# Patient Record
Sex: FEMALE | Race: BLACK OR AFRICAN AMERICAN | Employment: FULL TIME | URBAN - METROPOLITAN AREA
[De-identification: names, ages, dates, MRNs, and addresses within clinical notes are randomized per-mention and may not be internally consistent; named-entity substitution may affect disease eponyms.]

---

## 2019-07-01 ENCOUNTER — PATIENT OUTREACH (OUTPATIENT)
Dept: OTHER | Age: 34
End: 2019-07-01

## 2019-07-01 NOTE — PROGRESS NOTES
CCM progress note    Patient on report as eligible for Case Management. Ann [place] report for inpt stay [facility] / R nephrecomy 6/25. * Surgeon Dr. Mehdi Kimball  * Patient with hx of endometriosis found to have right sided severe hydronephrosis, tortuous ureter and poor perfusion/ left kidney accounting for 87% of total renal function. Patient eligible for [facility] Employee care management    PMH:   Past Medical History:   Diagnosis Date    Hydronephrosis     Ovarian cyst        Current Outpatient Medications   Medication Sig    docusate sodium (COLACE) 100 mg capsule Take 100 mg by mouth two (2) times a day.  HYDROcodone-acetaminophen (NORCO) 5-325 mg per tablet Take  by mouth.  ibuprofen (MOTRIN IB PO) Take 800 mg by mouth. No current facility-administered medications for this visit. Social History:   Social History     Socioeconomic History    Marital status: SINGLE     Spouse name: Not on file    Number of children: Not on file    Years of education: Not on file    Highest education level: Not on file   Occupational History    Not on file   Social Needs    Financial resource strain: Not on file    Food insecurity:     Worry: Not on file     Inability: Not on file    Transportation needs:     Medical: Not on file     Non-medical: Not on file   Tobacco Use    Smoking status: Never Smoker    Smokeless tobacco: Never Used   Substance and Sexual Activity    Alcohol use:  Yes    Drug use: Not on file    Sexual activity: Not on file   Lifestyle    Physical activity:     Days per week: Not on file     Minutes per session: Not on file    Stress: Not on file   Relationships    Social connections:     Talks on phone: Not on file     Gets together: Not on file     Attends Quaker service: Not on file     Active member of club or organization: Not on file     Attends meetings of clubs or organizations: Not on file     Relationship status: Not on file    Intimate partner violence:     Fear of current or ex partner: Not on file     Emotionally abused: Not on file     Physically abused: Not on file     Forced sexual activity: Not on file   Other Topics Concern    Not on file   Social History Narrative    Not on file     Two patient identifiers verified. Discussed the care management program.  Patient agrees to care management services at this time. Patient's primary care provider relationship reviewed with patient and modified, as applicable. * Modified CC:   Yoselin Sampson NP is PCP;  Vicente Yun is GYN; Jordy Guillaume is URO. Patient has significant diagnosis of endometriosis/ right nephrectomy 6/25. * DC Friday from [address] [place] [address] scripts for pain meds were not signed. - Patient has pain meds from previous stent insertion.   - Calls are in to hospital and Dr. Tip Galan office    - CM will also assist in getting pain meds for over the holiday. * Ms. 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 * Kidney pain initially believed to be part of endometriosis. Care management assessment completed:  Surgical/Wound Condition Focused Assessment    Skin- any open wounds or incisions? yes  Description and location of wound- R flank insision/ covered with blue dressing- per pt. C/D/I  In the last 24 hour have you experienced; Fever no    Low body temperature no    Chills or shaking no    Sweating no    Fast heart rate no    Fast breathing no    Dizziness/lightheadedness no    Confusion or unusual change in mental status no    Diarrhea no    Nausea no    Vomiting no    Shortness of breath or difficulty breathing no    Less urine output no    Cold, clammy, and pale skin no     Skin rash or skin color changes no  New or worsening pain? no  If yes, pain rated 0-10: 5-7 Location/pain characteristics:  Surgical pain/ expected.     New or worsening numbness or tingling? no  Activity level- moving several times a day, or as recommended? yes   Bathing and showering instructions? yes  Nutrition- prescribed diet? no   Tolerating food? yes  Hydration- (how much in ounces per day) 5-6 glasses/  CM supports better hydration with heat/ temps in 90's  Medications- new antibiotic? yes             Pain medication? Yes- old script/  See previous note. Does patient understand how and when to take their medications? yes      Patient stated problem: Post op recovery from nephrectomy- need pain meds. Care manager identified primary concern: Smooth recovery, pain management. Emotional Status/Adjustment to Health State: She is clear thinking and is \"just glad it's over. \"      Readiness to Change: []  Pre-contemplative    []  Contemplative  [x]  Preparation               []  Action                  []  Maintenance    Barriers/Challenges to Care: []  Decline in memory    []  Language barrier     []  Emotional                  []  Limited mobility  []  Lack of motivation     [] Vision, hearing or cognitive impairment []  Knowledge [] Financial Barriers  [x]  Other - pain management. Patient stated goal ; Care Manager/Provider identified medical goal   Care Coordination and support following R Nephrectomy    Support System/Resources: Family     Advance Care Planning:  Pt declines at this time. ADLS/DME: None. Referrals: None. Upcoming appointments:   Future Appointments   Date Time Provider Wesley Carol   7/18/2019  9:45 AM Jeffry Ware MD [address] pt activities to achieve better health:   []  Weight loss  []  Improved diabetic control  []  Decreased cholesterol levels  []  Decreased blood pressure  [x]  Monitor for postop recovery problems[]    Patient verbalized understanding of all information discussed. Pt has my contact information for any further questions, concerns, or needs. 12:15pm  Sent CC message to Dr. Fina Paulino requesting assistance with pain medications post op for the holiday. FU with him is not until 7/18. Plan for next call: Follow for pain meds tomorrow. 7/2        Chart Review:      HISTORY OF PRESENT ILLNESS:  Marcelo Echevarria is a 34 y.o. female who presents today for consultation s/p cysto/bilateral RPG, right JJ stent placement 4/17/2019. Patient with right hydroureteronephrosis - CT A/P W showed severe right hydronephrosis and hydroureter to level of the bladder but chronic in nature given cortical thinning.      Patient notes she presented to [facility] with severe right flank pain in early April. CT showed severe chronic right hydro. D/C'ed home. She returned with flank pain a few days later, when Lasix scan showed 13/87% split function (R/L) and unmeasurable T1/2 on the right side. R JJ stent was placed. RPG showed severe right hydro with tortuous ureter and transition point about 3 cm above the Right UO. Visit Vitals  /72   Ht 5' 2\" (1.575 m)   Wt 138 lb (62.6 kg)   BMI 25.24 kg/m²       IMPRESSION  1. Severe right hydronephrosis with severe overlying cortical thinning, as seen on prior CT 4/11/2019.  2. Unremarkable appearance of the left kidney. 3. Incidental 6.7 cm heterogeneous mass in the right adnexa, nonspecific, possible right hydrosalpinx. Clinical correlation warranted. Recommend dedicated pelvic ultrasound for further characterization as clinically warranted. Per chart review, the primary team is already aware and planning for GYN consultation.     Renal Scan 4/11/2019:  IMPRESSION   1. There is markedly delayed minimal to no perfusion of the right kidney. Additionally, there is minimal cortical uptake of radiotracer, and markedly delayed minimal excretion of radiotracer into the lower pole the right kidney. Findings are compatible with chronic urinary obstruction of the right kidney. 2. No scintigraphic evidence of urinary obstruction of the left kidney.    3.  Asymmetric renal function with the left kidney accounting for 87% of total renal function. [facility] outreach      h/o endometriosis, managed by Dr. Alexis Evangelista. No surgeries, trying different methods of birth control. Ruptured ovarian cyst in 2014.      PLAN:    Schedule for right laparoscopic nephrectomy.         We discussed the risks and benefits of laparoscopic nephrectomy including, but not limited to, infection, bleeding, blood transfusion, possible conversion to open nephrectomy, possible injury to surrounding organs requiring immediate or delayed repair, and global anesthesia risks including but not limited to CVA, MI, DVT, PE, pneumonia, and death. The patient expressed understanding and desire to proceed.

## 2019-07-01 NOTE — LETTER
7/1/2019 11:54 AM 
 
Ms. Donnamae Fothergill [address] [phone] Welcome Letter and Visit Checklist 
 
Congratulations for taking a step towards managing your health by participating in the Employee Care Management (ECM) program. ECM is a new model of care designed to improve the coordination of your health care with an emphasis on your overall well-being. This confidential program is offered free of charge to [employer] members and their covered family members. [facility] now partners with [facility]. If you are a qualifying employee, you may receive an additional 10 wellness incentive points for every month of active participation with an Employee Care Manager. BEFORE YOUR NEXT ECM NURSE ASSESSMENT CALL PLEASE USE THIS HANDY CHECKLIST: 
o Make a list of any questions you have about your health including questions about dietary recommendations, lifestyle, and disease management. o Inform the [facility] nurse of any other health care providers that you have visited in the last month and the reason why you visited them. This includes urgent care or the ED. 
o Have a list of all of your prescribed medications ready, over-the counter, herbal and dietary supplements. Inform the [facility] nurse of any refills that you require. o Inform the ECM nurse of any new problems that may have developed in the last month. 
o Confirm the date of your next [facility] nurse assessment call. 
o Anderson for our patient portal Invaluable if you have not already. This is a good way to communicate with your Care Team, including your doctor and [facility] nurse, as well as to view your care plan. 
o If you want to designate a caregiver to have access to your record, please ask your doctor's office for the forms to sign. o Think about any goals you want to begin working on towards a goal of better health. With continued partnership in the [facility] program, we hope to optimize your health, increase your quality of life, and prevent hospitalization. We look forward to continuing to serve you. If you have any health concerns prior to you next [facility] AND SURGERY [facility] outreach, please contact your primary care doctor. Your ECM nurse contact information is listed below for non-urgent questions: 
Christopher Wiggins RN, MS, [address] [facility]       Phone:  [phone]     Fax:  [phone]    [email or web address]

## 2019-07-02 ENCOUNTER — PATIENT OUTREACH (OUTPATIENT)
Dept: OTHER | Age: 34
End: 2019-07-02

## 2019-07-02 RX ORDER — HYDROXYZINE HYDROCHLORIDE 10 MG/1
10 TABLET, FILM COATED ORAL
Refills: 0 | COMMUNITY
Start: 2019-06-05 | End: 2021-11-29

## 2019-07-02 RX ORDER — ACETAMINOPHEN 325 MG/1
TABLET ORAL
COMMUNITY
End: 2022-09-30

## 2019-07-02 NOTE — PROGRESS NOTES
CM  follow up call. Patient with  inpt stay [facility] / R nephrecomy - DC . * Surgeon Dr. Timmy Foley for surgery   * Patient with hx of endometriosis found to have right sided severe hydronephrosis, tortuous ureter and poor perfusion/ left kidney accounting for 87% of total renal function. Chart review:  No documentation     Contacted patient for CM follow up services. Verified  and address for HIPAA security. * MED CHANGES:  Obtained prescription for pain meds: To  later this morning.     - She thinks it is more Norco, but needs to look/ and she is not at home. * MD APTS:  FU with surgeon . CM encourages FU with PCP. * Patient Concerns:  She ran a temp of 99.4 overnight and this morning.     - This is without tylenol.   - She has not been very active due to pain level with no meds. - CM supports patient, phone assessment:  No swelling, pain, inflammation at suture site; No drainage; No SOB, CP, swelling/edema. U/O remains good. She is drinking 6-8 glasses of water a day. No hematuria or unusual odor/color to urine; No lifting or activity which would cause post op trauma. BM today- no abd pain-tenderness. - Using incentive spirometer less to keep from pain. * CM Concerns:  Risk for infection/ risk for post op complications due to inactivity. * Education/ Resources. - OARS for MI used:  CM expresses empathy and understanding of protecting herself from pain with little pain medication. Hopes now that she has new supply, she can be more aggressive in pulmonary toilet. CM provides patient education on why pulmonary toilet is important - prevent URI/ left over anesthesia; pneumonia. Reviewed red flags   - Reviewed DVT risk with inactivity post op/ reviewed red flags.    - CM promotes more intake of water/ encourages diet with fruits/ veggies that also allow for better hydration (melon/ cherries/ oranges)    Patient will:      - Up her intake of water/ add more moist foods; Increase activity (while avoiding heat outside)- discussed options such as walking inside grocery store/ inside closed mall, and inside house (stairs every hour/ once and build as her strength increases). - Use incentive spirometer every hour- actively cough using pillow to brace surgical site. - Take tylenol over the next few days in addition to pain medications (ensuring her new medication is not percocet/ she will look to make sure it is acetaminophen free). - Call physician's service if temp is above 101 or any s/sx of infection/DVT. Patient can call CM until 5pm tonight if concerns or questions arise. Patient is aware that CM will be off until Monday 7/8. Offered CM coverage for Wed/Fri by a peer- Patient declines. She knows she can call PCP/ surgeon on call/ or go to  if needed. Will follow for CM services.    Next planned outreach:  Monday 7/8

## 2019-07-08 ENCOUNTER — PATIENT OUTREACH (OUTPATIENT)
Dept: OTHER | Age: 34
End: 2019-07-08

## 2019-07-08 NOTE — PROGRESS NOTES
CCM Progress Note    10:40 am  Called patient for Care Management FU. Left discreet voice mail with my contact information encourage a call back. Patient with  inpt stay [facility] / José Charlton nephrecomy 6/25- DC 6/28.     * Surgeon Dr. Gris Jones for surgery 7/18  * Patient with hx of endometriosis found to have right sided severe hydronephrosis, tortuous ureter and poor perfusion/ left kidney accounting for 87% of total renal function. No return call. Next attempt if no CB from pt: Thurs 7/18 after FU apt.

## 2019-07-16 PROBLEM — R10.9 FLANK PAIN: Status: ACTIVE | Noted: 2019-04-11

## 2019-07-18 ENCOUNTER — PATIENT OUTREACH (OUTPATIENT)
Dept: OTHER | Age: 34
End: 2019-07-18

## 2019-07-18 NOTE — PROGRESS NOTES
[facility] Progress Note    11:20 am Called patient for Care Management at agreed time. Left discreet voice mail with my contact information encourage a call back. No return call:  Next attempt Thurs 8/8      Chart Review:  FU visit URO  7/16    ASSESSMENT:       ICD-10-CM ICD-9-CM     1. Hydronephrosis, unspecified hydronephrosis type N13.30 591 AMB POC URINALYSIS DIP STICK AUTO W/O MICRO   2. Nonfunctioning kidney N28.9 593.9        - Chronic right distal ureteral obstruction, likely from endometriosis, with severe cortical atrophy and 13% split function by renal scan               S/p right lap nephrectomy on 6/27/2019     - Endometriosis - managed by Dr. Mary Pettit. - S/p ruptured ovarian cyst in 2014.      PLAN:    Surgical pathology reviewed - copy provided for her records  Okay to return to work next week  Contact office with any fevers or worsening pain  FU 3 months with BMP on arrival, sooner as indicated     HISTORY OF PRESENT ILLNESS:  Marcelo Echevarria is a 30 y.o. female who presents today for post-op s/p right lap nephrectomy on 6/27/2019     Patient originally presented to [facility] with severe right flank pain in early April. CT showed severe chronic right hydro. D/C'ed home. She returned with flank pain a few days later, when Lasix scan showed 13/87% split function (R/L) and unmeasurable T1/2 on the right side. R JJ stent was placed. RPG showed severe right hydro with tortuous ureter and transition point about 3 cm above the Right UO.     She presents today post-operatively from recent nephrectomy. She went to ER on 7/7/2019 for abdominal pain that was worsened by start of menstrual cycle. She had CT that showed complex fluid collection at surgical site. She was afebrile without leukocytosis with pain improving while hospitalized so drainage was deferred. Patient reports much improved pain and is no longer needing narcotic pain medication. She wants to return to work. No fevers, chills, N/V.  Improved abdominal swelling with mild residual soreness. No calf pain or leg swelling. No urinary complaints of hematuria or dysuria. Asymptomatic for UTI.      h/o endometriosis, managed by Dr. Arron Toussaint. No surgeries, trying different methods of birth control. Ruptured ovarian cyst in 2014. Visit Vitals  /60   Ht 5' 2\" (1.575 m)   Wt 135 lb (61.2 kg)   BMI 24.69 kg/m²      Surgical Pathology (6/27/2019)  MORCELLATED NON FUNCTIONING RIGHT KIDNEY, RADIAL LAPAROSCOPIC NEPHRECTOMY:    - BENIGN KIDNEY WITH FEATURES OF HYDRONEPHROSIS AND CHRONIC PYELONEPHRITIS.

## 2019-08-08 ENCOUNTER — PATIENT OUTREACH (OUTPATIENT)
Dept: OTHER | Age: 34
End: 2019-08-08

## 2019-08-08 NOTE — LETTER
8/8/2019 1:45 PM 
 
Ms. [address]ille [address] [phone] Dear Ms. Claudette Jules,   
 
I have been trying to reach you for a follow up call for services with our Employee Care Management Program. Your wellbeing is very important to us. With continued partnership in the [facility] AND SURGERY [facility] program, we hope to work with you to optimize your health and increase your quality of life. I look forward to continuing to work with you. Please contact me at your convenience and we can schedule a time that works best for you. Sincerely, 
 
 
 
 
 
Christine Farley RN, MS, [address] [facility]       Phone:  [phone]     Fax:  [phone]    [email or web address]

## 2019-08-08 NOTE — PROGRESS NOTES
[facility] Progress Note    1:45pm Called patient for Care Management at agreed time. Left discreet voice mail with my contact information encourage a call back. * Will send Lost to contact letter. * Noted Visit with [facility] set for 10/10/2019. Patient with  inpt stay [facility] for [facility] / José Charlton nephrecomy 6/25- DC 6/28.     * Surgeon Dr. Vielka Garcia for surgery 7/18  * Patient with hx of endometriosis found to have right sided severe hydronephrosis, tortuous ureter and poor perfusion/ left kidney accounting for 87% of total renal function. Next planned call if no response from patient:  Thurs 8/29. Chart Review:  Path report/  Surgical Pathology Case Report  Surgical Pathology Report                         Case: [id number]                                  Authorizing Provider: Zia Godinez MD        Collected:           06/27/2019 1410              Ordering Location:     [facility]    Received:            06/27/2019 [facility]                                     [facility]                                                    Pathologist:           Patti Marte MD                                                            Specimen:    Kidney Right, morcellated right non functioning kidney in 10% formalin                   Diagnosis  MORCELLATED NON FUNCTIONING RIGHT KIDNEY, RADIAL LAPAROSCOPIC NEPHRECTOMY:    - BENIGN KIDNEY WITH FEATURES OF HYDRONEPHROSIS AND CHRONIC PYELONEPHRITIS.        Electronic Signature: Brianna Chavez MD   CPT: 80995N7  Electronically signed by Oscar Herrera MD on 7/2/2019 at 91 [phone] 06

## 2019-08-27 ENCOUNTER — HOSPITAL ENCOUNTER (OUTPATIENT)
Dept: LAB | Age: 34
Discharge: HOME | End: 2019-08-27
Payer: COMMERCIAL

## 2019-08-27 LAB
25(OH)D3 SERPL-MCNC: 10.6 NG/ML (ref 30–100)
ALBUMIN SERPL-MCNC: 3.7 G/DL (ref 3.4–5)
ALBUMIN/GLOB SERPL: 0.9 {RATIO} (ref 0.8–1.7)
ALP SERPL-CCNC: 60 U/L (ref 45–117)
ALT SERPL-CCNC: 17 U/L (ref 13–56)
ANION GAP SERPL CALC-SCNC: 3 MMOL/L (ref 3–18)
APPEARANCE UR: CLEAR
AST SERPL-CCNC: 9 U/L (ref 10–38)
BACTERIA URNS QL MICRO: ABNORMAL /HPF
BASOPHILS # BLD: 0 K/UL (ref 0–0.1)
BASOPHILS NFR BLD: 0 % (ref 0–3)
BILIRUB SERPL-MCNC: 0.4 MG/DL (ref 0.2–1)
BILIRUB UR QL: NEGATIVE
BUN SERPL-MCNC: 10 MG/DL (ref 7–18)
BUN/CREAT SERPL: 11 (ref 12–20)
CALCIUM SERPL-MCNC: 8.7 MG/DL (ref 8.5–10.1)
CHLORIDE SERPL-SCNC: 107 MMOL/L (ref 100–111)
CHOLEST SERPL-MCNC: 131 MG/DL
CO2 SERPL-SCNC: 28 MMOL/L (ref 21–32)
COLOR UR: YELLOW
CREAT SERPL-MCNC: 0.94 MG/DL (ref 0.6–1.3)
DIFFERENTIAL METHOD BLD: ABNORMAL
EOSINOPHIL # BLD: 0.1 K/UL (ref 0–0.4)
EOSINOPHIL NFR BLD: 4 % (ref 0–5)
EPITH CASTS URNS QL MICRO: ABNORMAL /LPF (ref 0–5)
ERYTHROCYTE [DISTWIDTH] IN BLOOD BY AUTOMATED COUNT: 14.2 % (ref 11.6–14.5)
EST. AVERAGE GLUCOSE BLD GHB EST-MCNC: 108 MG/DL
FERRITIN SERPL-MCNC: 47 NG/ML (ref 8–388)
FOLATE SERPL-MCNC: 9.9 NG/ML (ref 3.1–17.5)
GLOBULIN SER CALC-MCNC: 4.3 G/DL (ref 2–4)
GLUCOSE SERPL-MCNC: 79 MG/DL (ref 74–99)
GLUCOSE UR STRIP.AUTO-MCNC: NEGATIVE MG/DL
HBA1C MFR BLD: 5.4 % (ref 4.2–5.6)
HCT VFR BLD AUTO: 36.1 % (ref 35–45)
HDLC SERPL-MCNC: 59 MG/DL (ref 40–60)
HDLC SERPL: 2.2 {RATIO} (ref 0–5)
HGB BLD-MCNC: 11.5 G/DL (ref 12–16)
HGB UR QL STRIP: ABNORMAL
IRON SATN MFR SERPL: 15 %
IRON SERPL-MCNC: 42 UG/DL (ref 50–175)
KETONES UR QL STRIP.AUTO: NEGATIVE MG/DL
LDLC SERPL CALC-MCNC: 61 MG/DL (ref 0–100)
LEUKOCYTE ESTERASE UR QL STRIP.AUTO: NEGATIVE
LIPID PROFILE,FLP: NORMAL
LYMPHOCYTES # BLD: 1.5 K/UL (ref 0.8–3.5)
LYMPHOCYTES NFR BLD: 54 % (ref 20–51)
MCH RBC QN AUTO: 25.7 PG (ref 24–34)
MCHC RBC AUTO-ENTMCNC: 31.9 G/DL (ref 31–37)
MCV RBC AUTO: 80.6 FL (ref 74–97)
MONOCYTES # BLD: 0.2 K/UL (ref 0–1)
MONOCYTES NFR BLD: 6 % (ref 2–9)
MUCOUS THREADS URNS QL MICRO: ABNORMAL /LPF
NEUTS SEG # BLD: 1 K/UL (ref 1.8–8)
NEUTS SEG NFR BLD: 36 % (ref 42–75)
NITRITE UR QL STRIP.AUTO: NEGATIVE
PH UR STRIP: 5.5 [PH] (ref 5–8)
PLATELET # BLD AUTO: 290 K/UL (ref 135–420)
PMV BLD AUTO: 9.6 FL (ref 9.2–11.8)
POTASSIUM SERPL-SCNC: 4.1 MMOL/L (ref 3.5–5.5)
PROT SERPL-MCNC: 8 G/DL (ref 6.4–8.2)
PROT UR STRIP-MCNC: NEGATIVE MG/DL
RBC # BLD AUTO: 4.48 M/UL (ref 4.2–5.3)
RBC #/AREA URNS HPF: ABNORMAL /HPF (ref 0–5)
RBC MORPH BLD: ABNORMAL
SODIUM SERPL-SCNC: 138 MMOL/L (ref 136–145)
SP GR UR REFRACTOMETRY: 1.02 (ref 1–1.03)
TIBC SERPL-MCNC: 274 UG/DL (ref 250–450)
TRIGL SERPL-MCNC: 55 MG/DL (ref ?–150)
TSH SERPL DL<=0.05 MIU/L-ACNC: 1.5 UIU/ML (ref 0.36–3.74)
UROBILINOGEN UR QL STRIP.AUTO: 0.2 EU/DL (ref 0.2–1)
VIT B12 SERPL-MCNC: 440 PG/ML (ref 211–911)
VLDLC SERPL CALC-MCNC: 11 MG/DL
WBC # BLD AUTO: 2.8 K/UL (ref 4.6–13.2)
WBC URNS QL MICRO: ABNORMAL /HPF (ref 0–4)

## 2019-08-27 PROCEDURE — 82306 VITAMIN D 25 HYDROXY: CPT

## 2019-08-27 PROCEDURE — 85025 COMPLETE CBC W/AUTO DIFF WBC: CPT

## 2019-08-27 PROCEDURE — 83540 ASSAY OF IRON: CPT

## 2019-08-27 PROCEDURE — 36415 COLL VENOUS BLD VENIPUNCTURE: CPT

## 2019-08-27 PROCEDURE — 84443 ASSAY THYROID STIM HORMONE: CPT

## 2019-08-27 PROCEDURE — 83036 HEMOGLOBIN GLYCOSYLATED A1C: CPT

## 2019-08-27 PROCEDURE — 80053 COMPREHEN METABOLIC PANEL: CPT

## 2019-08-27 PROCEDURE — 80061 LIPID PANEL: CPT

## 2019-08-27 PROCEDURE — 81001 URINALYSIS AUTO W/SCOPE: CPT

## 2019-08-27 PROCEDURE — 82728 ASSAY OF FERRITIN: CPT

## 2019-08-27 PROCEDURE — 83021 HEMOGLOBIN CHROMOTOGRAPHY: CPT

## 2019-08-27 PROCEDURE — 82607 VITAMIN B-12: CPT

## 2019-08-27 PROCEDURE — 87389 HIV-1 AG W/HIV-1&-2 AB AG IA: CPT

## 2019-08-28 LAB
HIV 1+2 AB+HIV1 P24 AG SERPL QL IA: NONREACTIVE
HIV12 RESULT COMMENT, HHIVC: NORMAL

## 2019-08-29 ENCOUNTER — PATIENT OUTREACH (OUTPATIENT)
Dept: OTHER | Age: 34
End: 2019-08-29

## 2019-08-29 LAB
DEPRECATED HGB OTHER BLD-IMP: 0 %
HGB A MFR BLD: 97.6 % (ref 96.4–98.8)
HGB A2 MFR BLD COLUMN CHROM: 2.4 % (ref 1.8–3.2)
HGB C MFR BLD: 0 %
HGB F MFR BLD: 0 % (ref 0–2)
HGB FRACT BLD-IMP: NORMAL
HGB S BLD QL SOLY: NEGATIVE
HGB S MFR BLD: 0 %

## 2019-08-29 NOTE — PROGRESS NOTES
[facility] Progress Note      11:10  am  Called patient for Care Management FU. Left discreet voice mail with my contact information encourage a call back. Patient with  inpt stay [facility] / Spike Orozco nephrecomy 6/25- DC 6/28.     * Surgeon Dr. Erlin Neville for surgery 7/18  * Patient with hx of endometriosis found to have right sided severe hydronephrosis, tortuous ureter and poor perfusion/ left kidney accounting for 87% of total renal function. Last successful contact was 7/2/2019. Lost contact letter sent 8/8/2019.

## 2019-09-19 ENCOUNTER — PATIENT OUTREACH (OUTPATIENT)
Dept: OTHER | Age: 34
End: 2019-09-19

## 2019-09-19 NOTE — PROGRESS NOTES
CCM progress note    Called Ms. Salazar Penn  at agreed time. Verified  and address for HIPAA security. Changes since last call include:     Med changes: Added Vit D. Doctors appointments : Upcoming     * Patient is sorry she hasn't returned my calls. - Back to work since later July. - Work has been busy - works days. - After work she has activities or is very tired. - She would like to remain active in CM.     - Mail goes to her mother's home, so she has not seen UTR letter. She will look for it this weekend. * FU with PCP finds anemia;    - Started on Vit D.   - Referred to hematology. - Restricting caffeine. * Some residual fluid pocket with R nephrectomy found on ultrasound.     - No drain placed with surgery; Expected to resolve with no intervention at this point. CM advises warm compresses to the area to assist with fluid removal and relaxing blood vessels/ lymph supply. - No infection. - CM assesses:  Pain is intermittent, not continuous; No elements make it worse or better; She is not aware of any specific times or positions that bring on the pain. Pain is lessening as time goes on.  /  Reviewed red flags.     - No FU until 10/10 with Dr. Fina Paulino,  Urology. * CM shared that she will be taking time off from -10/11 and asks if patient would like a FU call while she is out. Offered contact information for covering RN.     - Pt declines. Will be OK with waiting for CMs return to office. Check in for the patients goals:    1) Goal    :   Care coordination following nephrectomy  A) Progress - Fluid pocket causing pain/  No infection. B) Barriers addressed - pain/  Risk for infection with fluid pocket      C) Utilizing strategy - Red flag teach back; Application of warm compress. D) Plan for next check in  Monday 10/14       Advanced Directive:  Patient offered information on development of Advanced Care Planning.      * Patient defers this topic to another time. Patient's current stage of activation:         Action-  Ongoing support;  Reinforcement of change attempts; Expect set-backs;  Rolling with resistance; OARS       Patient verbalized understanding of all information discussed. Pt has my contact information for any further questions, concerns, or needs. Plan for next call: Monday 10/14 FU after URO apt. Fluid pocket/ anemia workup?

## 2019-10-14 ENCOUNTER — PATIENT OUTREACH (OUTPATIENT)
Dept: OTHER | Age: 34
End: 2019-10-14

## 2019-10-14 NOTE — PROGRESS NOTES
[facility]  Progress Note    * Kuld[place] BEACH report for inpt stay [facility] for Surgery / R nephrecomy . * Surgeon Dr. Escobar Drain  * Patient with hx of endometriosis found to have right sided severe hydronephrosis, tortuous ureter and poor perfusion/ left kidney accounting for 87% of total renal function. * Planned FU visit 10/10 with URO.       12:30pm  Called patient for Care Management at agreed time. Calls failed x 2.    * Call placed at lunchtime for convenience of patient. Incoming call:  4:30pm    Contacted  patient for CM follow up services. Verified  and address for HIPAA security. * \"My pain is almost worse than before I had my surgery\"   - Pain at incision site close to menstrual cycle. - CM asks if endometriosis is in her history/   Yes since 26 yo. CM encourages FU OB/GYN apt to r/o endometriosis as contributing factor to pain. * [facility] this weekend due to the pain. - No infection;  Did not r/o fluid buildup. - Tylenol for pain. - Pt using heating pad - but may have a slight burn from overuse. Teaching on heating pad precautions.   / never sleep with heating pad - try hot water bottle - safer. Limit time of heating pad - use timer - 20 min on/  20 min off;  Use fabric or dish towel for separation - less chance of burn. Try moist heat - towel in hot water - moist heat can penetrate better. She does use salon-pas in addition which is helpful. * URO apt has been pushed back to 10/21.     - Practice mistook her for another patient.     - Dr. Ria Acosta is her MD -   - CM encourages pt to call URO for any tests needed prior to visit to ensure decision making can be done. * Heme/Onc visit - no report on findings yet. * MED CHANGES: None. * MD APTS:  URO 10/21.  ;  PCP aware - ordered BW.    * Patient Concerns:  Ongoing pain from ? Cause. * CM Concerns:  Delay in treatment. * Education/ Resources.   Heating pad use/  Suggested hot water bottle - and fabric separation;  Timer use for 20 minute on/off monitoring. Advanced Directive:  Patient offered information on development of Advanced Care Planning. * Patient defers this topic to another time. Will follow for CM services. Next planned outreach:  Tues 10/22 - after URO apt.

## 2019-10-22 ENCOUNTER — PATIENT OUTREACH (OUTPATIENT)
Dept: OTHER | Age: 34
End: 2019-10-22

## 2019-10-22 NOTE — PROGRESS NOTES
[place] BEACH report for inpt stay [facility] for Surgery / Vane Salinas nephrecomy 6/25.     * Surgeon Dr. Armstrong Other  * Patient with hx of endometriosis found to have right sided severe hydronephrosis, tortuous ureter and poor perfusion/ left kidney accounting for 87% of total renal function    12:20pm  Called patient for Care Management FU after URO visit 10/21. Left discreet voice mail with my contact information encourage a call back. * May be ready to close, but would like to SW patient for final review of needs. Next call planned for:  Nov 12. Chart Review:  URO visit 10/21    S/p right lap nephrectomy on 6/27/2019 for chronically obstructed kidney, likely 2/2 endometriosis. Path: benign with features of hydronephrosis and chronic pyelonephritis              Advised to minimize NSAIDs to help preserve kidney function. As long as kidney function today looks good, follow up PRN     - Endometriosis - managed by Dr. Shey Pimentel.    - S/p ruptured ovarian cyst in 2014.      PLAN:    BMP drawn today, will inform   F/u with GYN for continued management of endometriosis   Recommend monitoring of kidney function annually (BMP) with PCP as long as kidney function today is normal  Follow up PRN

## 2019-11-12 ENCOUNTER — PATIENT OUTREACH (OUTPATIENT)
Dept: OTHER | Age: 34
End: 2019-11-12

## 2019-11-12 NOTE — PROGRESS NOTES
Resolving current episode of case management. Patient has met patient stated and/or medical goals. Patient consistenly demonstrates understanding of the medical action plan through execution of plan. Appointments with key providers are scheduled and attended. Plan of care is modified and updated to address new challenges and barriers with minimal support from the CM team(proactively uses physicians and community resources) The support system remains current and has been modified as needed. Patient continues to acquire needed resources from the current support system established. Teach back demonstrates that patient understands education for self management of chronic conditions. Final call to [facility] patient with R nephrecomy 6/25.     * Patient with hx of endometriosis / right sided severe     * Ms. Centeno Common reports that she is doing much better. Pain is less, will follow up with GYN for lingering endometriosis. - Surgeon attributes pain to endometriosis. * Focus on lifestyle changes with increased hydration and taking regular walks daily. * No further CM needs identified. ECM will follow as needed and patient has ECM contact information for future needs.

## 2020-05-11 ENCOUNTER — DOCUMENTATION ONLY (OUTPATIENT)
Dept: OTHER | Age: 35
End: 2020-05-11

## 2020-07-14 ENCOUNTER — PATIENT OUTREACH (OUTPATIENT)
Dept: OTHER | Age: 35
End: 2020-07-14

## 2020-07-14 NOTE — PROGRESS NOTES
CCM outreach  Former CCM patient. 11:00am  Patient on report as eligible for Case Management. Left discreet message on voicemail with this CM contact information. Will attempt to contact again to offer [address] [place] [address] Management services. * ED visits to [facility] [place]:  6/1- abd pain;  7/4 immune thrombocytopenic purpura.

## 2020-07-16 ENCOUNTER — PATIENT OUTREACH (OUTPATIENT)
Dept: OTHER | Age: 35
End: 2020-07-16

## 2020-07-16 NOTE — PROGRESS NOTES
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 Verified  and address for HIPAA security. Offered continued ECMT services for patient. Patient does not identify any Care Management needs at this time and declines services. * CM encourages patient to use [facility] for her health needs. And utilize Nurse Triage line prior to ED.     - Nephrologist uses [employer]. - She does not know if she has Plus or Flex plan.

## 2020-07-21 ENCOUNTER — PATIENT OUTREACH (OUTPATIENT)
Dept: OTHER | Age: 35
End: 2020-07-21

## 2020-07-21 RX ORDER — DOXYCYCLINE 100 MG/1
100 CAPSULE ORAL 2 TIMES DAILY
COMMUNITY
Start: 2020-07-20 | End: 2020-07-30

## 2020-07-21 RX ORDER — OXYCODONE AND ACETAMINOPHEN 5; 325 MG/1; MG/1
TABLET ORAL
COMMUNITY
Start: 2020-07-19 | End: 2021-11-29

## 2020-07-21 NOTE — PROGRESS NOTES
[facility] progress note    Incoming call from patient requesting  assistance. Patient eligible for Miguel Coronado 994 care management  Former [facility] patient found with  ED visits to [facility] [place]:  6/1- abd pain;  7/4 immune thrombocytopenic purpura     Two patient identifiers verified. * Ms. Sisi Figueroa reports to  that she has been in [facility] [place] discharged yesterday.    - Recurring Abd pain was presenting clinical picture. - Found to have ovarian cyst and abdominal hernia. - She is not aware what kind of hernia it is/ umbilical or surgical.    explains hole in muscle that other tissue becomes stuck in. Explained incarcerated hernia as emergent need to save tissue from dying from lack of blood flow. - She was put on a course of IV antibiotics. Discharged yesterday. PO Doxycycline BID x 10 days. Given Percocet for pain as needed. Home today. -  encourages hydration, ambulation and a light diet. - Needing surgery soon. * She needs assistance with FMLA/ HR contact. -  shares [address] (call [phone]); press 1 for Leave of Absence team.    * Plus plan so identifies need to stay within [facility]. - Surgical practice downstairs from her work/ plans to call today for intake. - reviews that with our new insurance, If you do think that you need to be seen in the Emergency Room, a call to the Nurse Access Line will reduce your copayment. The number is in red on your insurance card and is [phone] ([phone]). Patient's primary care provider relationship reviewed with patient and modified, as applicable. Patient has significant diagnosis of newly diagnosed ovarian cyst and hernia with underlying immune thrombocytopenic purpura    Care management assessment completed:    Patient stated problem /  Care manager identified primary concern   * Care Coordination for ovarian cyst and hernia/ needs surgery.      Emotional Status/Adjustment to Health State: planning surgery- ready to be over the abdominal pain. Readiness to Change: []  Pre-contemplative    []  Contemplative  [x]  Preparation               []  Action                  []  Maintenance    Barriers/Challenges to Care: []  Decline in memory    []  Language barrier     []  Emotional                  []  Limited mobility  []  Lack of motivation     [] Vision, hearing or cognitive impairment [x]  Knowledge [x] Financial Barriers  [x]  Other    Support System/Resources:  Family    Advance Care Planning: Declines. Her mother is her chosen medical decision maker:   Gene Stewart  [phone]    Upcoming appointments: No future appointments. Patient verbalized understanding of all information discussed. Pt has my contact information for any further questions, concerns, or needs. - She will call me when surgery if planned if before next call.     Plan for next call:  2 Weeks 8/4

## 2020-07-21 NOTE — ACP (ADVANCE CARE PLANNING)
Advance Care Planning: Declines.    Her mother is her chosen medical decision maker:   Vicki Jl  [phone]

## 2020-07-24 ENCOUNTER — PATIENT OUTREACH (OUTPATIENT)
Dept: OTHER | Age: 35
End: 2020-07-24

## 2020-07-24 NOTE — PROGRESS NOTES
CCM  Care Coordination. Patient with ovarian cyst, hernia and immune thrombocytopenic purpura. *hx of endometriosis found to have right sided severe hydronephrosis, tortuous ureter and poor perfusion/ left kidney     11:00am  Incoming email from Clau Keating RN.   - Clinical notes indicate pregnancy. - ITP/low platelets and IVIG treatments,    -  UTI and cystitis   - Notes throughout clinical states she is pregnant but no details how many weeks      11:20 am  .  Contacted  patient for CM follow up services. Verified  and address for HIPAA security. * Patient is managing her pain from the ovarian cyst and hernia. - trying to limit Percocet to nightly. * Vaginal bleeding current/ but not a heavy flow- spotting.     - Patient confirms with CM that she is not pregnant. - CM offers red flags of increased flow if platelets are low. *  FU Hem/Onc in Aug - only on oral Fe;  IVIG once to bring up her platelet count. - Mirralax daily to prevent constipation with iron pills. - Eating iron rich foods. *  FU apt Tues - to work up for surgery / for FMLA paperwork     - Hopes to have a date for surgery at that time/ she will keep me updated. * MED CHANGES: Decreasing pain meds. * MD APTS:  PCP T[place];   Hem/Onc in Aug 8/11 she thinks  * Patient Concerns:   None - just anxious to have surgery to relieve pain. * CM Concerns:  Kidney function- risk for bleeding with   * Education/ Resources. Will follow for CM services. Next planned outreach:   after MD powell - schedule surgery?

## 2020-07-28 ENCOUNTER — PATIENT OUTREACH (OUTPATIENT)
Dept: OTHER | Age: 35
End: 2020-07-28

## 2020-07-28 NOTE — PROGRESS NOTES
CM  follow up call. Patient with ovarian cyst, hernia and immune thrombocytopenic purpura. *hx of endometriosis found to have right sided severe hydronephrosis, tortuous ureter and poor perfusion/ left kidney     Chart review:  Providers are not in CC         Patient had apt today to schedule surgery - Check in to share POC and surgical date. 12:50 pm  Attempt to contact patient for transitions of care. Unable to leave message on voicemail \"Mailbox full. \"       Will follow for CM services. Next planned outreach:  Monday 8/3 if no return call from patient.

## 2020-08-03 ENCOUNTER — PATIENT OUTREACH (OUTPATIENT)
Dept: OTHER | Age: 35
End: 2020-08-03

## 2020-08-03 NOTE — PROGRESS NOTES
CM  follow up call. Patient with ovarian cyst, hernia and immune thrombocytopenic purpura. *hx of endometriosis found to have right sided severe hydronephrosis, tortuous ureter and poor perfusion/ left kidney     Chart review:  Providers are not within CC. Contacted  patient for CM follow up services. Verified  and address for HIPAA security. * Ms. Maldonado Search reports that her FU visit with Dr. Madison Galvan wants her to consider before deciding for R sided BSO and fibroid removal.     - She reports that she is firm on having surgery. * She has been out of work for 2 weeks now. - on FMLA- wonders if she will need new FMLA forms for this next surgery. - She has been out with no pay. - Does not know about disability coverage. CM offers referral to [employer] SW- she would like this support. * She is planning Surgery at [facility], but may need a new surgeon.     - Patient would appreciate assistance. Care Coordination:     - CM searches on [facility] Website for surgery options. OP surgery services are available, but unclear if GYN surgery is on the list.   - CM calls [facility] x 3 - on hold x2 with ultimate hang up; And once sent to OP surgery center with prolonged ringing- no answer or answering services. [phone]   - JENNY preforms google search to find:   [id number] and Sandria Landau MD OB/GYN.     - CM looks up on MMO find provider site/ GYN doctors within 50 miles of patient's home.   NO RESULTS FOUND: [email or web address]? [email or web address]/aF7BvzoB3h5FDrbh6kyAOkuwZMUOPyY6ERRr+mjMrw0iPIDzjib1esAWO/0f7F/RhW6srmXaMPH0RZdw/KeJPadrOXMvTDMNqqG+nXs2XaPbSoAlZnWRENdPpmuEJYGgHnHsWLAGRUwfIGxq4AjViioorXQr8KnQ9JfgS5a9bBwBljzOBUdKULWbva1ydKXZfrykNRHmgcoiZ3e9nzJxVeueFpStKZWDWPfFCH16D2SLI3nZo83ZDG6uLxndo9GLTOMisw+zOPWKKPFlTD19Mn90YayjD/4lIR8TqfHOXbYo1iYVeR9GXBVFYcPEHIzn5LMwh2DGPYoPBRgduMlJ+x9nl0OXTcNNs2pQWxCQqAXTApv6no0p4n+PmEq4sMqaXKTBHEDXLNME3TdDtOmNt   - CM calls [employer] [address] [place] [address] Road line:  [phone]. Automated lines would not allow CM to access customer services with no NPI.       - CM resources peer in the [facility] area. Possible sites closest to patient are [facility] and [facility]. Dina Garcia LPN will assist with researching for in network options. 1:58 pm  Called patient back. Advised her to call member's services at [facility] and request an out of network waiver. To have if needed for her Thurs apt.      3:26 Call back from Dina Garcia. [facility] is closing as of Sept 1. Not taking new apts. - Provider Arturo Suero MD office contacted. [phone]   - They do accept MMO insurance. - Patient can call and make apt.     3:30pm  Call out to patient to share this information. Mailbox Full message - unable to leave message for patient. * MED CHANGES: Completed antibiotics/ no more pain meds. * MD APTS:  Ce Ybarra FU with GYN - to plan surgery - may require OON services. Will follow for CM services. Next planned outreach:  8/4 FU as needed.

## 2020-08-03 NOTE — Clinical Note
I need some help with MsManuel Kristal Leventhal, please.     @ Addie Stepan - we spoke about in network services/ providers for a BSO/ fibroidectomy?    @ Eddie Trujillo - can you guide her on disability application? She has been leave without pay for 2 weeks. FMLA in place/ she may need to start a new FMLA with new surgery.

## 2020-08-04 ENCOUNTER — PATIENT OUTREACH (OUTPATIENT)
Dept: OTHER | Age: 35
End: 2020-08-04

## 2020-08-04 NOTE — PROGRESS NOTES
SHAYNE PERDOMO contacted patient to assess financial needs. Patient identifiers confirmed, zip code and . Patient referred by Nurse ECM, Kayode Lopez. Patient updates  Ms. Ely Rivero stated that she was approved for [place]/. She was assigned an Absentee Manager, Eduin Price. Ms. Ely Rivero stated that she will not know when her procedure will be scheduled until 20. The STD paperwork can not be completed until the procedure has been performed. Ms. Ely Rivero stated that she has been out of work for approximately 2 weeks. She added that she does not have any financial concerns at this time. Patient stated that she has money in her savings that she will use until she is eligible for STD    Follow-up  SHAYNE PERDOMO will follow-up with patient in 2 weeks.

## 2020-08-04 NOTE — PROGRESS NOTES
CM  follow up call. Patient with ovarian cyst, hernia and immune thrombocytopenic purpura. *hx of endometriosis found to have right sided severe hydronephrosis, tortuous ureter and poor perfusion/ left kidney     12:30pm  Incoming call from patient for CM follow up services. Verified  and address for HIPAA security. * Ms. Lester Carrillo reports to CM that she was at the \[facility]\"" imaging center yesterday so calls were blocked. - still no order from Dr. Isael Coughlin for MRI. - CM offers to call office/ Ms. Lester Carrillo states she has already spoken to nurse - and Dr. Isael Coughlin will be in the office later today. * Discussed Plus plan coverage and explained OON waiver process.     - Ms. Lester Carrillo reports to CM that office staff told her that he was in network. - CM will share MMO look up provider site with patient at her home email -  [email or web address]   /  Added to demographics. * Encouraged patient to check back with SW - she has already returned the call to Ms. Souza. - Will be able to take calls all day today. * Empathized with patient about her pain and delay of needed surgery. Frustration acknowledged. - Keep the apt on Thursday and take Dr. Sumner  information if referral is needed. - CM can assist with waiver process if needed to expidite the process and get her to surgery. - CM encouraged her to talk to MD about surgery if ovary and fallopian tube removal would take GYN surgeon/ while hernia may need general surgeon. Both may need to be coordinated on the same surgical day. * MED CHANGES:  None  * MD APTS:  Tues with Dr. Aaron Banegas Huges/ may change with no MRI. * Patient Concerns:  Getting surgery as soon as possible;  ongong absence from work. - MMO rep is out today, back tomorrow. * CM Concerns:  Care Coordination to meet patient's needs and ensure insurance coverage. * Education/ Resources.   Waiver process;  Provider look up for insurance; call to customer service to begin waiver. Facility as well as provider may need to be pre-authed. Will follow for CM services. Next planned outreach:  Patient to call CM Thursday 8/6 for updates and plans to move forward / physician's POC.

## 2020-08-06 ENCOUNTER — PATIENT OUTREACH (OUTPATIENT)
Dept: OTHER | Age: 35
End: 2020-08-06

## 2020-08-06 NOTE — PROGRESS NOTES
CM  follow up call. Patient with ovarian cyst, hernia and immune thrombocytopenic purpura. *hx of endometriosis found to have right sided severe hydronephrosis, tortuous ureter and poor perfusion/ left kidney      Chart review:  FU with Dr. Lexi Tejada today/ attended. Incoming call from patient for CM follow up services. Verified  and address for HIPAA security. * Ms. Damion Pleitez was not able to get her imaging study prior to appt/  Got a print out of the order today and she will FU. * Dr. Lexi Tejada has coordinated with a general surgeon and the earliest date for surgery open is 2020.     - He is coordinating with urology to ensure no difficulties. - While Dr. Lexi Tejada states he is within [facility], he does not have privileges at [facility]. -  Surgery will be at [facility] (5 minutes from her mother's home) or [facility]? Web site search for hospitals - perhaps [address]? Or [place] with patient the need to have the hospital stay pre-authorized. Joselito web site:  [email or web address]. aspx?[email or web address]     * Discussed patient's pain level - right now \"it's not too bad. \"   She reports taking occasional pain medication at night when it keeps her awake. - Hopes for no flares while waiting for surgery. * MED CHANGES:  None/  Less pain medication. * MD APTS:  FU after imaging completed. * Patient Concerns:  Pain level while waiting for surgery. * CM Concerns:  Staying within network and getting services cleared and approved. Will follow for CM services. Next planned outreach:  - Care Coordination for out of network services for facilities. FU on imaging.   Call in 2 weeks:

## 2020-08-20 ENCOUNTER — PATIENT OUTREACH (OUTPATIENT)
Dept: OTHER | Age: 35
End: 2020-08-20

## 2020-08-20 NOTE — PROGRESS NOTES
CM  follow up call. Patient with ovarian cyst, hernia and immune thrombocytopenic purpura. *hx of endometriosis found to have right sided severe hydronephrosis, tortuous ureter and poor perfusion/ left kidney     Chart review:  Surgical release requested from [address].      8:15 am Incoming call from  patient for CM follow up services. Verified  and address for HIPAA security. * Surgery has been moved to 10/22 at [facility].    - 2 surgeons / GYN for ovarian cyst and General surgery for hernia repair. * Post op plans are for her to stay with her mother for the first week. * No MRI. Problems with doctor's orders.    - Second order sent this week - more specific (area of body/ w/wo contrast)   - Finally set up for next . * Long discussion of lack of in network resources to address her needs. Flex plan in place. - CM will work to coordinate with insurance and physician's office for exceptions as needed. - Patient will communicate this to physician's office on her next encounter. * MED CHANGES:  Back to work/ no pain meds. * MD APTS:   Pending surgical release by URO     Will follow for CM services. Next planned outreach: 9/3 - Thurs after CT.

## 2020-08-27 ENCOUNTER — PATIENT OUTREACH (OUTPATIENT)
Dept: OTHER | Age: 35
End: 2020-08-27

## 2020-08-27 NOTE — PROGRESS NOTES
MSW ECM attempted to contact patient. No answer. Discreet VM left. The purpose of TC was to discuss STD status.

## 2020-09-09 ENCOUNTER — HOSPITAL ENCOUNTER (OUTPATIENT)
Dept: MRI IMAGING | Age: 35
Discharge: HOME | End: 2020-09-09
Payer: COMMERCIAL

## 2020-09-09 VITALS — BODY MASS INDEX: 24.87 KG/M2 | WEIGHT: 136 LBS

## 2020-09-09 DIAGNOSIS — N80.42 ENDOMETRIOSIS OF RECTOVAGINAL SEPTUM AND VAGINA: ICD-10-CM

## 2020-09-09 PROCEDURE — 72197 MRI PELVIS W/O & W/DYE: CPT

## 2020-09-09 PROCEDURE — 74011636320 HC RX REV CODE- 636/320

## 2020-09-09 PROCEDURE — A9575 INJ GADOTERATE MEGLUMI 0.1ML: HCPCS

## 2020-09-09 RX ADMIN — GADOTERATE MEGLUMINE 12 ML: 376.9 INJECTION INTRAVENOUS at 15:39

## 2020-09-15 ENCOUNTER — PATIENT OUTREACH (OUTPATIENT)
Dept: OTHER | Age: 35
End: 2020-09-15

## 2020-09-25 ENCOUNTER — PATIENT OUTREACH (OUTPATIENT)
Dept: OTHER | Age: 35
End: 2020-09-25

## 2020-09-25 NOTE — PROGRESS NOTES
CM  follow up call. Patient with ovarian cyst, hernia and immune thrombocytopenic purpura. *hx of endometriosis found to have right sided severe hydronephrosis, tortuous ureter and poor perfusion/ left kidney     Chart review:  No new documentation. 7:30am   Incoming call from patient for CM follow up services. Verified  and address for HIPAA security. * No painful flares from ovarian cyst - achy sometimes. * Surgery is on hold. Surgical suite was double booked, so office is rescheduling date. - No assistance from CM identified. * CM ensures that patient has Percocet available if needed. Patient confirms she does. - CM reviewes self care measures for pain-  Hydration/  Loose clothing / heating pad (low) or hot water bottle/  Warm shower/  Deep breathing. * MED CHANGES:  None  * MD APTS:  None  * Patient Concerns: Worried for flares/ wants to avoid pain if possible. * CM Concerns:  Risk of ED visits due to pain. * Education/ Resources. - Self care measures for addressing pain from ovarian cyst.      * CM informed patient that she would be out of the office 10/5-10/12. Shared coverage ACM contact information. Will follow for CM services.    Next planned outreach: 10/16- Friday-  FU on surgical plan- pain from ovarian cyst.

## 2020-09-29 ENCOUNTER — OFFICE VISIT (OUTPATIENT)
Age: 35
End: 2020-09-29

## 2020-09-29 DIAGNOSIS — D50.9 IRON DEFICIENCY ANEMIA, UNSPECIFIED IRON DEFICIENCY ANEMIA TYPE: Primary | ICD-10-CM

## 2020-10-12 ENCOUNTER — TELEPHONE (OUTPATIENT)
Age: 35
End: 2020-10-12

## 2020-10-13 ENCOUNTER — OFFICE VISIT (OUTPATIENT)
Age: 35
End: 2020-10-13
Payer: COMMERCIAL

## 2020-10-13 ENCOUNTER — HOSPITAL ENCOUNTER (OUTPATIENT)
Dept: INFUSION THERAPY | Age: 35
Discharge: HOME | End: 2020-10-13
Payer: COMMERCIAL

## 2020-10-13 ENCOUNTER — PATIENT OUTREACH (OUTPATIENT)
Dept: OTHER | Age: 35
End: 2020-10-13

## 2020-10-13 VITALS
SYSTOLIC BLOOD PRESSURE: 159 MMHG | WEIGHT: 135.4 LBS | HEART RATE: 67 BPM | DIASTOLIC BLOOD PRESSURE: 101 MMHG | TEMPERATURE: 98.4 F | BODY MASS INDEX: 24.92 KG/M2 | HEIGHT: 62 IN

## 2020-10-13 VITALS — DIASTOLIC BLOOD PRESSURE: 101 MMHG | HEART RATE: 67 BPM | SYSTOLIC BLOOD PRESSURE: 159 MMHG | TEMPERATURE: 98.4 F

## 2020-10-13 DIAGNOSIS — D69.6 THROMBOCYTOPENIA (HCC): ICD-10-CM

## 2020-10-13 DIAGNOSIS — D50.9 IRON DEFICIENCY ANEMIA, UNSPECIFIED IRON DEFICIENCY ANEMIA TYPE: ICD-10-CM

## 2020-10-13 DIAGNOSIS — D69.6 THROMBOCYTOPENIA (HCC): Primary | ICD-10-CM

## 2020-10-13 DIAGNOSIS — N94.89 ADNEXAL MASS: ICD-10-CM

## 2020-10-13 DIAGNOSIS — N92.4 EXCESSIVE BLEEDING IN PREMENOPAUSAL PERIOD: ICD-10-CM

## 2020-10-13 LAB
ALBUMIN SERPL-MCNC: 3.4 G/DL (ref 3.4–5)
ALBUMIN/GLOB SERPL: 0.7 {RATIO} (ref 0.8–1.7)
ALP SERPL-CCNC: 48 U/L (ref 45–117)
ALT SERPL-CCNC: 20 U/L (ref 13–56)
ANION GAP SERPL CALC-SCNC: 4 MMOL/L (ref 3–18)
AST SERPL-CCNC: 19 U/L (ref 10–38)
BASOPHILS # BLD: 0 K/UL (ref 0–0.1)
BASOPHILS NFR BLD: 0 % (ref 0–3)
BILIRUB SERPL-MCNC: 0.3 MG/DL (ref 0.2–1)
BUN SERPL-MCNC: 14 MG/DL (ref 7–18)
BUN/CREAT SERPL: 15 (ref 12–20)
CALCIUM SERPL-MCNC: 8.8 MG/DL (ref 8.5–10.1)
CHLORIDE SERPL-SCNC: 108 MMOL/L (ref 100–111)
CO2 SERPL-SCNC: 25 MMOL/L (ref 21–32)
CREAT SERPL-MCNC: 0.94 MG/DL (ref 0.6–1.3)
DIFFERENTIAL METHOD BLD: ABNORMAL
EOSINOPHIL # BLD: 0 K/UL (ref 0–0.4)
EOSINOPHIL NFR BLD: 1 % (ref 0–5)
ERYTHROCYTE [DISTWIDTH] IN BLOOD BY AUTOMATED COUNT: 13.8 % (ref 11.6–14.5)
ERYTHROCYTE [SEDIMENTATION RATE] IN BLOOD: 31 MM/HR (ref 0–20)
FERRITIN SERPL-MCNC: 26 NG/ML (ref 8–388)
FOLATE SERPL-MCNC: 17.4 NG/ML (ref 3.1–17.5)
GLOBULIN SER CALC-MCNC: 4.8 G/DL (ref 2–4)
GLUCOSE SERPL-MCNC: 77 MG/DL (ref 74–99)
HAPTOGLOB SERPL-MCNC: 123 MG/DL (ref 30–200)
HCT VFR BLD AUTO: 35.8 % (ref 35–45)
HGB BLD-MCNC: 11.7 G/DL (ref 12–16)
IRON SATN MFR SERPL: 18 % (ref 20–50)
IRON SERPL-MCNC: 66 UG/DL (ref 50–175)
LDH SERPL L TO P-CCNC: 148 U/L (ref 81–234)
LYMPHOCYTES # BLD: 1.7 K/UL (ref 0.8–3.5)
LYMPHOCYTES NFR BLD: 46 % (ref 20–51)
MCH RBC QN AUTO: 26.4 PG (ref 24–34)
MCHC RBC AUTO-ENTMCNC: 32.7 G/DL (ref 31–37)
MCV RBC AUTO: 80.6 FL (ref 74–97)
MONOCYTES # BLD: 0.6 K/UL (ref 0–1)
MONOCYTES NFR BLD: 17 % (ref 2–9)
NEUTS SEG # BLD: 1.3 K/UL (ref 1.8–8)
NEUTS SEG NFR BLD: 36 % (ref 42–75)
PLATELET # BLD AUTO: 49 K/UL (ref 135–420)
PLATELET COMMENTS,PCOM: ABNORMAL
POTASSIUM SERPL-SCNC: 3.8 MMOL/L (ref 3.5–5.5)
PROT SERPL-MCNC: 8.2 G/DL (ref 6.4–8.2)
RBC # BLD AUTO: 4.44 M/UL (ref 4.2–5.3)
RBC MORPH BLD: ABNORMAL
SODIUM SERPL-SCNC: 137 MMOL/L (ref 136–145)
TIBC SERPL-MCNC: 367 UG/DL (ref 250–450)
VIT B12 SERPL-MCNC: 272 PG/ML (ref 211–911)
WBC # BLD AUTO: 3.6 K/UL (ref 4.6–13.2)

## 2020-10-13 PROCEDURE — 82728 ASSAY OF FERRITIN: CPT

## 2020-10-13 PROCEDURE — 80053 COMPREHEN METABOLIC PANEL: CPT

## 2020-10-13 PROCEDURE — 36415 COLL VENOUS BLD VENIPUNCTURE: CPT

## 2020-10-13 PROCEDURE — 99204 OFFICE O/P NEW MOD 45 MIN: CPT | Performed by: INTERNAL MEDICINE

## 2020-10-13 PROCEDURE — 83615 LACTATE (LD) (LDH) ENZYME: CPT

## 2020-10-13 PROCEDURE — 85025 COMPLETE CBC W/AUTO DIFF WBC: CPT

## 2020-10-13 PROCEDURE — 83540 ASSAY OF IRON: CPT

## 2020-10-13 PROCEDURE — 85652 RBC SED RATE AUTOMATED: CPT

## 2020-10-13 PROCEDURE — 82607 VITAMIN B-12: CPT

## 2020-10-13 PROCEDURE — 83010 ASSAY OF HAPTOGLOBIN QUANT: CPT

## 2020-10-13 NOTE — PROGRESS NOTES
[facility]  [id number] [place] [address], [address]  [place], [facility]  Office Phone: [phone]  Fax: [phone]      Reason for visit:  Thrombocytopenia    HPI:   Michael De Anda is a 37 y.o.  female who I was asked to see in consultation at the request of Dr. Jessica Keita for evaluation for thrombocytopenia. Labs on 7/19/2020 showed WBC 7.4, H&H 7.8/24.7, platelets 41,794. Patient thrombocytopenia started on 6/1/2020 prior to that her platelets have always been normal.  BUN and creatinine normal.    Patient was being followed at [place] [address] and received dexamethasone 40 mg p.o. daily for 4 days plus IVIG in June and July 2020. She was admitted at [facility] in July 2020 for ITP and was seen by Dr. Joshua Burton. On 7/14/2020, CT abdomen and pelvis showed left adnexal mass. Ultrasound of the abdomen the same day showed \"Bilateral multiple adnexal masslike structures, right more than left, which may represent any combination of normal ovarian tissue, ovarian mass, or exophytic fibroids.  -One of the right adnexal lesions demonstrates features suggestive of endometrioma. \"    I saw and examined patient today. She is awake alert oriented x3. On review of systems she denies any fevers, chills, shortness of breath, nausea vomiting or abdominal pain. Reports heavy menses. All other point of review of system have been reviewed and were negative. ECOG performance status 0. Independent with ADLs and IADLs. DX   Encounter Diagnoses   Name Primary?     Thrombocytopenia (HCC) Yes    Iron deficiency anemia, unspecified iron deficiency anemia type           Past Medical History:   Diagnosis Date    Hydronephrosis     Ovarian cyst      Past Surgical History:   Procedure Laterality Date    HX NEPHRECTOMY  06/27/2019    Laparoscopic right nephrectomy    HX OTHER SURGICAL       CYSTOSCOPY, WITH RIGHT RETROGRADE PYELOGRAM AND RIGHT URETERAL STENT REPLACEMENT     Social History     Socioeconomic History    Marital status: SINGLE     Spouse name: Not on file    Number of children: Not on file    Years of education: Not on file    Highest education level: Not on file   Tobacco Use    Smoking status: Never Smoker    Smokeless tobacco: Never Used   Substance and Sexual Activity    Alcohol use: Yes     No family history on file. Current Outpatient Medications   Medication Sig Dispense Refill    oxyCODONE-acetaminophen (PERCOCET) 5-325 mg per tablet TAKE 1 TABLET BY MOUTH EVERY 4 HOURS AS NEEDED (DO NOT EXCEED 4000MG OF ACETAMINOPHEN PER DAY. )      VITAMIN D2 50,000 unit capsule take 1 capsule by mouth every week for 4 weeks  0    hydrOXYzine HCl (ATARAX) 10 mg tablet Take 10 mg by mouth two (2) times daily as needed. 0    acetaminophen (TYLENOL) 325 mg tablet Take  by mouth every four (4) hours as needed for Pain. No Known Allergies    Review of Systems  As per HPI    Objective:  Physical Exam:  There were no vitals taken for this visit. General:  Alert, cooperative, no distress, appears stated age. Head:  Normocephalic, without obvious abnormality, atraumatic. Eyes:  Conjunctivae/corneas clear. PERRL, EOMs intact. Throat: Lips, mucosa, and tongue normal.    Neck: Supple, symmetrical, trachea midline, no adenopathy, thyroid: no enlargement/tenderness/nodules   Back:   Symmetric, no curvature. ROM normal. No CVA tenderness. Lungs:   Clear to auscultation bilaterally. Chest wall:  No tenderness or deformity. Heart:  Regular rate and rhythm, S1, S2 normal, no murmur, click, rub or gallop. Abdomen:   Soft, non-tender. Bowel sounds normal. No masses,  No organomegaly. Extremities: Extremities normal, atraumatic, no cyanosis or edema. Skin: Skin color, texture, turgor normal. No rashes or lesions. Lymph nodes: Cervical, supraclavicular, and axillary nodes normal.   Neurologic: CNII-XII intact.        Diagnostic Imaging     Results for orders placed in visit on 19   CT ABD [address] Cat Scan  [address] [facility] [address]  [phone]      Imaging Result     Name:    Duke Monge ([id number])  Sex: Female :  1985 Ordering Provider: Josephine RODRIGUEZ Provider:   Diagnosis:     Abd pain, RLQ, appendicitis suspected  Procedures Performed:  CT ABD/PELVIS-IV ONLY  [id number] Exam Date/Time:  2019  3:59 AM              EXAM: CT ABDOMEN AND PELVIS WITH CONTRAST     CLINICAL INDICATION/HISTORY: Abd pain, RLQ, appendicitis suspected . Lower abdominal pain and dysuria.      COMPARISON: CT abdomen/pelvis 2013     TECHNIQUE:  CT abdomen and pelvis with 100 cc of Omnipaque 350 IV contrast.  All CT scans at this facility are performed using dose optimization technique as appropriate to the performed examination, to include automated exposure control, adjustment of the mA and/or kV according to patient's size (including appropriate matching for site-specific examinations), or use of an iterative reconstruction technique.     FINDINGS:   Lower chest: Minimal bibasilar atelectasis. Small hiatal hernia.     Liver: Negative.      Biliary: Negative.     Pancreas: Negative.     Spleen: Negative.     Adrenal glands: Negative.     Kidneys: Severe right hydronephrosis and hydroureter. No obstructing stone or mass is seen. The left kidney is unremarkable.     Stomach, Small Bowel and Colon: Diverticulosis. No evidence for diverticulitis. No small bowel obstruction. Normal appendix.     Bladder and Pelvic Organs:     Lymph nodes: No lymphadenopathy.      Vessels: Unremarkable for age.      Peritoneal Spaces: No free fluid or free air.     Body wall: Negative.     Bones: Unremarkable for age.     IMPRESSION      1. Severe right hydronephrosis and hydroureter to the level of the bladder, without clear etiology. Cortical thinning of the right kidney suggests this may be chronic. Recommend urologic evaluation and nonemergent CT urogram.     A notification that a wet reading is available was posted to the ED trackboard at 04:13 AM on 4/11/2019.     Dictated By: Mariaa Ledesma on 4/11/2019 4:15 AM     As attending radiologist, I have assessed the study images, and dictated or reviewed/edited the final report as needed.     Signed By: Maritza Briceno MD on 4/11/2019 4:24 AM           Dictated by: Dania Reardon on Thu Apr 11, 2019  4:13:56 AM EDT  Signed By:Fela Marie MD   4/11/2019  4:24 AM               Lab Results  Lab Results   Component Value Date/Time    WBC 2.8 (L) 08/27/2019 09:00 AM    HGB 11.5 (L) 08/27/2019 09:00 AM    HCT 36.1 08/27/2019 09:00 AM    PLATELET 423 91/37/2355 09:00 AM    MCV 80.6 08/27/2019 09:00 AM       Lab Results   Component Value Date/Time    Sodium 141 10/21/2019 02:21 PM    Potassium 3.7 10/21/2019 02:21 PM    Chloride 103 10/21/2019 02:21 PM    CO2 23 10/21/2019 02:21 PM    Anion gap 15.0 10/21/2019 02:21 PM    Glucose 89 10/21/2019 02:21 PM    BUN 12 10/21/2019 02:21 PM    Creatinine 0.8 10/21/2019 02:21 PM    BUN/Creatinine ratio 11 (L) 08/27/2019 09:00 AM    GFR est AA >60 08/27/2019 09:00 AM    GFR est non-AA >60 08/27/2019 09:00 AM    Calcium 9.2 10/21/2019 02:21 PM    Alk. phosphatase 60 08/27/2019 09:00 AM    Protein, total 8.0 08/27/2019 09:00 AM    Albumin 3.7 08/27/2019 09:00 AM    Globulin 4.3 (H) 08/27/2019 09:00 AM    A-G Ratio 0.9 08/27/2019 09:00 AM    ALT (SGPT) 17 08/27/2019 09:00 AM     Follow-up with PCP for health maintenance  Assessment/Plan:  37 y.o. female with   1. Thrombocytopenia ([address]  Patient was diagnosed in June 2020 of ITP. She is status post dexamethasone 40 mg p.o. daily for 4 days as well as IVIG from Dr. Matt Flynn at [facility] with resolution of thrombocytopenia as per previous hematologist note. If platelets clumping then I will use citrate. H pylori negative. - CBC WITH AUTOMATED DIFF;  Future  - FERRITIN; Future  - IRON PROFILE; Future  - METABOLIC PANEL, COMPREHENSIVE; Future  - PERIPHERAL SMEAR; Future  - VITAMIN B12 & FOLATE; Future  - SED RATE (ESR); Future  - LD; Future  - HAPTOGLOBIN; Future    2. Iron deficiency anemia, unspecified iron deficiency anemia type  I will recheck iron profile and ferritin and replace as needed. - CBC WITH AUTOMATED DIFF; Future  - FERRITIN; Future  - IRON PROFILE; Future  - METABOLIC PANEL, COMPREHENSIVE; Future  - PERIPHERAL SMEAR; Future  - VITAMIN B12 & FOLATE; Future  - SED RATE (ESR); Future  - LD; Future  - HAPTOGLOBIN; Future    3. Menorrhagia: Follow-up with OB/GYN. 4. Left adnexal mass: S/p CT abdomen pelvis and ultrasound of the abdomen on 7/14/2020. Reviewed MRI of abdomen done on 9/9/20, She has planned OB surgery on 12/10/2.     Return in one week-virtual

## 2020-10-13 NOTE — PROGRESS NOTES
Care Coordination/  CCM patient. Patient with ovarian cyst, hernia and immune thrombocytopenic purpura, and pervasive endometriosis. *hx of endometriosis found to have right sided severe hydronephrosis, tortuous ureter and poor perfusion/ left kidney     2:15pm  Incoming call from Ms. Angus Glass. Verified  and address for HIPAA security. * She is on her way to new patient visit with Onc/ Dr. Nickie Lara- for thrombocytopenia  * She asks CM for assistance in ensuring her upcoming surgery 12/10 is approved by insurance. - Asked for form to take to MD.  * JENNY suggests that she will call the office and offer my assistance directly. Hoping to take stress off of her in this matter. I will keep her informed and offered to give her a copy of any form I provide to the office for her records. 2:30pm   Call placed to Dr. Donna Aquino' office. SW  after several transfers. Offered my assistance to ensure that Hospital stay at [facility] for surgery 12/10 would be preauthorized. - Was told that Kayleigh Su for the office and is off today. - CM explains preauth needed now with new insurance for [facility]- and offered my help and support if needed. - CM left her contact info for Sudheer Corley to call me for assistance on return to office.     3:00pm  Called MsManuel HORTON reporting that CM called office and left her contact info. * noted arrival at apt with Dr. Nickie Lara for evaluation and management of thrombocytopenia. FU Friday 10/16 as planned.

## 2020-10-13 NOTE — PROGRESS NOTES
[facility] Progress Note    Date: 2020    Name: Yehuda Vu    MRN: [id number]         : 1985    Peripheral Lab Draw      Ms. Lilly to [facility], ambulatory at [phone] accompanied by self. Pt was assessed and education was provided. Ms. Bala Tinoco vitals were reviewed and patient was observed for 5 minutes prior to treatment. Visit Vitals  BP (!) 159/101   Pulse 67   Temp 98.4 °F (36.9 °C) (Oral)       Blood obtained peripherally from left arm x 1 attempt with butterfly needle and sent to lab for Cbc w/diff, Cmp, Iron Profile, Sed rate, LD, Haptoglobin, Peripheral Smear, Vitamin B12 & Folate per written orders. No bleeding or hematoma noted at site. Gauze and coban applied. Ms. Rosetta Spring tolerated the phlebotomy, and had no complaints. Patient armband removed and shredded. Ms. Rosetta Spring was discharged from [place] [address] in stable condition at [phone].      Desirae Decatur Phlebotomist PCT  2020  3:59 PM

## 2020-10-14 LAB — PERIPHERAL SMEAR,PSM: NORMAL

## 2020-10-20 ENCOUNTER — PATIENT OUTREACH (OUTPATIENT)
Dept: OTHER | Age: 35
End: 2020-10-20

## 2020-10-20 NOTE — PROGRESS NOTES
CM  follow up call. Patient with ovarian cyst, hernia and immune thrombocytopenic purpura, and pervasive endometriosis. *hx of endometriosis found to have right sided severe hydronephrosis, tortuous ureter and poor perfusion/ left kidney     Chart review:  FU Heme/Onc tomorrow. Contacted  patient for CM follow up services. Verified  and address for HIPAA security. * Ms. Anatoly Jimenez shares that she has been pain free from ovarian cyst.    - Work changes with 1 person leaving - she is planning to postpone her surgery until . * CM encourages patient to look carefully at insurance options for open enrollment next week. - Advises Flex plan for wider network options. * FU in November- if she remains stable, will close CCM    * MED CHANGES:  None  * MD APTS:  Dr. Brandee Recinos tomorrow. * Patient Concerns: Worried about recurrent pain from ovarian cyst.   * CM Concerns:  Any risk of rupture of ovarian cyst- infection risks. Will follow for CM services.    Next planned outreach:   - if stable and no surgery- close CCM

## 2020-10-21 ENCOUNTER — VIRTUAL VISIT (OUTPATIENT)
Age: 35
End: 2020-10-21
Payer: COMMERCIAL

## 2020-10-21 DIAGNOSIS — N92.4 EXCESSIVE BLEEDING IN PREMENOPAUSAL PERIOD: ICD-10-CM

## 2020-10-21 DIAGNOSIS — D50.9 IRON DEFICIENCY ANEMIA, UNSPECIFIED IRON DEFICIENCY ANEMIA TYPE: ICD-10-CM

## 2020-10-21 DIAGNOSIS — D69.6 THROMBOCYTOPENIA (HCC): Primary | ICD-10-CM

## 2020-10-21 DIAGNOSIS — N94.89 ADNEXAL MASS: ICD-10-CM

## 2020-10-21 PROCEDURE — 99443 PR PHYS/QHP TELEPHONE EVALUATION 21-30 MIN: CPT | Performed by: INTERNAL MEDICINE

## 2020-10-21 RX ORDER — PREDNISONE 20 MG/1
TABLET ORAL
Qty: 27 TAB | Refills: 0 | Status: SHIPPED | OUTPATIENT
Start: 2020-10-21 | End: 2021-11-29

## 2020-10-21 RX ORDER — PREDNISONE 10 MG/1
10 TABLET ORAL
Qty: 9 TAB | Refills: 0 | Status: SHIPPED | OUTPATIENT
Start: 2020-10-21 | End: 2021-11-29

## 2020-10-21 NOTE — PROGRESS NOTES
Lori Olea is a 37 y.o. female who was seen by synchronous (real-time) audio- technology on 10/21/2020. Assessment & Plan:   Diagnoses and all orders for this visit:    1. Thrombocytopenia ([address]    2. Iron deficiency anemia, unspecified iron deficiency anemia type    3. Excessive bleeding in premenopausal period    4. Adnexal mass        The complexity of medical decision making for this visit is moderate       1. Thrombocytopenia (HCC)/ITP  Patient was diagnosed in June 2020 of ITP. She is status post dexamethasone 40 mg p.o. daily for 4 days as well as IVIG from Dr. Milly Greco at [facility] with resolution of thrombocytopenia as per previous hematologist note. H pylori negative. On 10/13/2020, WBC 3.6, H&H 11.7/35.8, MCV 80.6, platelets 74,902. Normal differential.  Normal LDH and haptoglobin. ESR elevated at 31 (020). Vitamin B12 =272, normal folate. Peripheral blood smear showed thrombocytopenia and mild anemia with unremarkable RBCs morphology. BUN 14, creatinine 0.94. Transferrin saturation 18%, ferritin 26. Mrs. Lilly thrombocytopenia is likely due to immune thrombocytopenia since is responded previously to steroids. She denies any bleeding. I will treat her with steroids with slow taper as follow:    Prednisone 60 mg p.o. daily for 3 days then  Prednisone 50 mg p.o. daily for 3 days then  Prednisone 40 mg p.o. daily for 3 days then  Prednisone 30 mg p.o. daily for 3 days then  Prednisone 20 mg p.o. daily for 3 days then  Prednisone 10 mg p.o. daily for 3 days then stop and recheck CBC.       2. Iron deficiency anemia, unspecified iron deficiency anemia type  I had a long discussion with patient and told her that she has iron deficiency likely due to her menorrhagia.   Plan is to give her IV iron Injectafer x2 then recheck iron profile and ferritin 3 months after.     3. Menorrhagia: Follow-up with OB/GYN.     4. Left adnexal mass: S/p CT abdomen pelvis and ultrasound of the abdomen on 7/14/2020. Reviewed MRI of abdomen done on 9/9/20, She has planned OB surgery on 12/10/20. RTC 20 days with CBC same day     I spent at least 25 minutes on this visit with this established patient. 712  Subjective:   Cheryle Ramirez is a 37 y.o.  female who I was asked to see in consultation at the request of Dr. Casie Novak for evaluation for thrombocytopenia.     Labs on 7/19/2020 showed WBC 7.4, H&H 7.8/24.7, platelets 94,944. Patient thrombocytopenia started on 6/1/2020 prior to that her platelets have always been normal.  BUN and creatinine normal.     Patient was being followed at [facility].  and received dexamethasone 40 mg p.o. daily for 4 days plus IVIG in June and July 2020. She was admitted at [facility] in July 2020 for ITP and was seen by Dr. Islea Bauer.     On 7/14/2020, CT abdomen and pelvis showed left adnexal mass. Ultrasound of the abdomen the same day showed \"Bilateral multiple adnexal masslike structures, right more than left, which may represent any combination of normal ovarian tissue, ovarian mass, or exophytic fibroids.  -One of the right adnexal lesions demonstrates features suggestive of endometrioma. \"     I saw and examined patient today. She is awake alert oriented x3. On review of systems she denies any fevers, chills, shortness of breath, nausea vomiting or abdominal pain. Reports heavy menses. All other point of review of system have been reviewed and were negative. ECOG performance status 0. Independent with ADLs and IADLs.      Prior to Admission medications    Medication Sig Start Date End Date Taking?  Authorizing Provider   oxyCODONE-acetaminophen (PERCOCET) 5-325 mg per tablet TAKE 1 TABLET BY MOUTH EVERY 4 HOURS AS NEEDED (DO NOT EXCEED 4000MG OF ACETAMINOPHEN PER DAY.) 7/19/20   Provider, Historical   VITAMIN D2 50,000 unit capsule take 1 capsule by mouth every week for 4 weeks 8/28/19   Provider, Historical   hydrOXYzine HCl (ATARAX) 10 mg tablet Take 10 mg by mouth two (2) times daily as needed. 6/5/19   Provider, Historical   acetaminophen (TYLENOL) 325 mg tablet Take  by mouth every four (4) hours as needed for Pain. Provider, Historical     Patient Active Problem List   Diagnosis Code    Flank pain R10.9    Thrombocytopenia (HCC) D69.6    Iron deficiency anemia D50.9    Excessive bleeding in premenopausal period N92.4    Adnexal mass N94.89     Patient Active Problem List    Diagnosis Date Noted    Thrombocytopenia ([address] 10/13/2020    Iron deficiency anemia 10/13/2020    Excessive bleeding in premenopausal period 10/13/2020    Adnexal mass 10/13/2020    Flank pain 04/11/2019     Current Outpatient Medications   Medication Sig Dispense Refill    oxyCODONE-acetaminophen (PERCOCET) 5-325 mg per tablet TAKE 1 TABLET BY MOUTH EVERY 4 HOURS AS NEEDED (DO NOT EXCEED 4000MG OF ACETAMINOPHEN PER DAY. )      VITAMIN D2 50,000 unit capsule take 1 capsule by mouth every week for 4 weeks  0    hydrOXYzine HCl (ATARAX) 10 mg tablet Take 10 mg by mouth two (2) times daily as needed. 0    acetaminophen (TYLENOL) 325 mg tablet Take  by mouth every four (4) hours as needed for Pain. No Known Allergies  Past Medical History:   Diagnosis Date    Hydronephrosis     Idiopathic thrombocytopenic purpura (ITP) (HCC)     Leukopenia     Ovarian cyst     Thrombocytopenia (HCC)      Past Surgical History:   Procedure Laterality Date    HX COLONOSCOPY      HX NEPHRECTOMY  06/27/2019    Laparoscopic right nephrectomy    HX OTHER SURGICAL       CYSTOSCOPY, WITH RIGHT RETROGRADE PYELOGRAM AND RIGHT URETERAL STENT REPLACEMENT     Family History   Problem Relation Age of Onset    Hypertension Mother      Social History     Tobacco Use    Smoking status: Never Smoker    Smokeless tobacco: Never Used   Substance Use Topics    Alcohol use: Yes       ROS: As per HPI    Objective:   No flowsheet data found.    General: alert, cooperative, no distress     Additional exam findings: We discussed the expected course, resolution and complications of the diagnosis(es) in detail. Medication risks, benefits, costs, interactions, and alternatives were discussed as indicated. I advised her to contact the office if her condition worsens, changes or fails to improve as anticipated. She expressed understanding with the diagnosis(es) and plan. Latanya Beltran, who was evaluated through a patient-initiated, synchronous (real-time) audio- encounter, and/or her healthcare decision maker, is aware that it is a billable service, with coverage as determined by her insurance carrier. She provided verbal consent to proceed: Yes, and patient identification was verified. It was conducted pursuant to the emergency declaration under the [address] [place] [address] authority and the [employer] Act. A caregiver was present when appropriate. Ability to conduct physical exam was limited. I was at home. The patient was at home.       Cindy Bird MD

## 2020-11-09 ENCOUNTER — TELEPHONE (OUTPATIENT)
Age: 35
End: 2020-11-09

## 2020-11-19 ENCOUNTER — HOSPITAL ENCOUNTER (OUTPATIENT)
Dept: INFUSION THERAPY | Age: 35
Discharge: HOME | End: 2020-11-19
Payer: COMMERCIAL

## 2020-11-19 VITALS
OXYGEN SATURATION: 100 % | SYSTOLIC BLOOD PRESSURE: 154 MMHG | DIASTOLIC BLOOD PRESSURE: 95 MMHG | RESPIRATION RATE: 18 BRPM | HEART RATE: 78 BPM | TEMPERATURE: 99 F

## 2020-11-19 DIAGNOSIS — D50.9 IRON DEFICIENCY ANEMIA, UNSPECIFIED IRON DEFICIENCY ANEMIA TYPE: Primary | ICD-10-CM

## 2020-11-19 PROCEDURE — 74011250636 HC RX REV CODE- 250/636: Performed by: INTERNAL MEDICINE

## 2020-11-19 PROCEDURE — 96365 THER/PROPH/DIAG IV INF INIT: CPT

## 2020-11-19 RX ORDER — HYDROCORTISONE SODIUM SUCCINATE 100 MG/2ML
100 INJECTION, POWDER, FOR SOLUTION INTRAMUSCULAR; INTRAVENOUS AS NEEDED
Status: CANCELLED | OUTPATIENT
Start: 2020-11-26

## 2020-11-19 RX ORDER — SODIUM CHLORIDE 9 MG/ML
10 INJECTION INTRAMUSCULAR; INTRAVENOUS; SUBCUTANEOUS AS NEEDED
Status: CANCELLED | OUTPATIENT
Start: 2020-11-19

## 2020-11-19 RX ORDER — SODIUM CHLORIDE 0.9 % (FLUSH) 0.9 %
10 SYRINGE (ML) INJECTION AS NEEDED
Status: CANCELLED | OUTPATIENT
Start: 2020-11-19

## 2020-11-19 RX ORDER — HEPARIN 100 UNIT/ML
300-500 SYRINGE INTRAVENOUS AS NEEDED
Status: CANCELLED
Start: 2020-11-19

## 2020-11-19 RX ORDER — ALBUTEROL SULFATE 0.83 MG/ML
2.5 SOLUTION RESPIRATORY (INHALATION) AS NEEDED
Status: CANCELLED
Start: 2020-11-26

## 2020-11-19 RX ORDER — DIPHENHYDRAMINE HYDROCHLORIDE 50 MG/ML
50 INJECTION, SOLUTION INTRAMUSCULAR; INTRAVENOUS AS NEEDED
Status: CANCELLED
Start: 2020-11-19

## 2020-11-19 RX ORDER — HEPARIN 100 UNIT/ML
300-500 SYRINGE INTRAVENOUS AS NEEDED
Status: CANCELLED
Start: 2020-11-26

## 2020-11-19 RX ORDER — SODIUM CHLORIDE 9 MG/ML
25 INJECTION, SOLUTION INTRAVENOUS CONTINUOUS
Status: CANCELLED | OUTPATIENT
Start: 2020-11-26

## 2020-11-19 RX ORDER — ONDANSETRON 2 MG/ML
8 INJECTION INTRAMUSCULAR; INTRAVENOUS AS NEEDED
Status: CANCELLED | OUTPATIENT
Start: 2020-11-19

## 2020-11-19 RX ORDER — EPINEPHRINE 1 MG/ML
0.3 INJECTION, SOLUTION, CONCENTRATE INTRAVENOUS AS NEEDED
Status: CANCELLED | OUTPATIENT
Start: 2020-11-26

## 2020-11-19 RX ORDER — DIPHENHYDRAMINE HYDROCHLORIDE 50 MG/ML
25 INJECTION, SOLUTION INTRAMUSCULAR; INTRAVENOUS AS NEEDED
Status: CANCELLED
Start: 2020-11-19

## 2020-11-19 RX ORDER — ACETAMINOPHEN 325 MG/1
650 TABLET ORAL AS NEEDED
Status: CANCELLED
Start: 2020-11-26

## 2020-11-19 RX ORDER — DIPHENHYDRAMINE HYDROCHLORIDE 50 MG/ML
50 INJECTION, SOLUTION INTRAMUSCULAR; INTRAVENOUS AS NEEDED
Status: CANCELLED
Start: 2020-11-26

## 2020-11-19 RX ORDER — ACETAMINOPHEN 325 MG/1
650 TABLET ORAL AS NEEDED
Status: CANCELLED
Start: 2020-11-19

## 2020-11-19 RX ORDER — DIPHENHYDRAMINE HYDROCHLORIDE 50 MG/ML
25 INJECTION, SOLUTION INTRAMUSCULAR; INTRAVENOUS AS NEEDED
Status: CANCELLED
Start: 2020-11-26

## 2020-11-19 RX ORDER — EPINEPHRINE 1 MG/ML
0.3 INJECTION, SOLUTION, CONCENTRATE INTRAVENOUS AS NEEDED
Status: CANCELLED | OUTPATIENT
Start: 2020-11-19

## 2020-11-19 RX ORDER — ALBUTEROL SULFATE 0.83 MG/ML
2.5 SOLUTION RESPIRATORY (INHALATION) AS NEEDED
Status: CANCELLED
Start: 2020-11-19

## 2020-11-19 RX ORDER — SODIUM CHLORIDE 9 MG/ML
10 INJECTION INTRAMUSCULAR; INTRAVENOUS; SUBCUTANEOUS AS NEEDED
Status: CANCELLED | OUTPATIENT
Start: 2020-11-26

## 2020-11-19 RX ORDER — SODIUM CHLORIDE 0.9 % (FLUSH) 0.9 %
10 SYRINGE (ML) INJECTION AS NEEDED
Status: CANCELLED | OUTPATIENT
Start: 2020-11-26

## 2020-11-19 RX ORDER — ONDANSETRON 2 MG/ML
8 INJECTION INTRAMUSCULAR; INTRAVENOUS AS NEEDED
Status: CANCELLED | OUTPATIENT
Start: 2020-11-26

## 2020-11-19 RX ORDER — HYDROCORTISONE SODIUM SUCCINATE 100 MG/2ML
100 INJECTION, POWDER, FOR SOLUTION INTRAMUSCULAR; INTRAVENOUS AS NEEDED
Status: CANCELLED | OUTPATIENT
Start: 2020-11-19

## 2020-11-19 RX ORDER — SODIUM CHLORIDE 9 MG/ML
25 INJECTION, SOLUTION INTRAVENOUS CONTINUOUS
Status: DISCONTINUED | OUTPATIENT
Start: 2020-11-19 | End: 2020-11-20 | Stop reason: HOSPADM

## 2020-11-19 RX ADMIN — FERRIC CARBOXYMALTOSE INJECTION 750 MG: 50 INJECTION, SOLUTION INTRAVENOUS at 14:33

## 2020-11-19 RX ADMIN — SODIUM CHLORIDE 25 ML/HR: 900 INJECTION, SOLUTION INTRAVENOUS at 14:33

## 2020-11-19 NOTE — PROGRESS NOTES
[facility] Progress Note    Date: 2020    Name: Ulysses Day    MRN: [id number]         : 1985    Injectafer Infusion 1 of 2    Ms. Lilly to [address], ambulatory, at 1410. Pt was assessed and education was provided. Patient educated, on blood pressure. Encouraged to follow up with PCP. Ms. Zigmund Sever vitals were reviewed and patient was observed for 5 minutes prior to treatment. Visit Vitals  BP (!) 154/95 (BP 1 Location: Right arm, BP Patient Position: Sitting)   Pulse 78   Temp 99 °F (37.2 °C)   Resp 18   SpO2 100%   Breastfeeding No         22 g PIV placed in RAC x 1 attempt by Roxy Cruz. RN PIV flushed easily and had brisk blood return. Injectafer 750 mg was initiated @ 750 ml/hr over 20 minutes. VS stable and pt denied complaints of itching, lip/tongue/facial swelling, SOB, CP or other complaints. Ms. MUELLER [facility] tolerated the infusion, and had no complaints. VS remained stable. Patient observed 30 minutes post infusion. PIV flushed with NS 10 ml and removed. No bleeding or hematoma noted at site. Anaya and coban applied. Reviewed discharge instructions with patient, including expected side effects (abdominal cramping, nausea, changes in color of urine or feces) and signs of allergic reaction requiring medical attention (itching/hives/rashes, SOB, chest pain, lip/tongue/facial swelling). Patient given printed copy to take home. Patient verbalized understanding of discharge instructions. Patient armband removed and shredded. Ms. MUELLER [facility] at 1530. She is to return 2020 at 1000 for next appointment.     Som Freeman RN  2020  3:14 PM

## 2020-11-24 ENCOUNTER — PATIENT OUTREACH (OUTPATIENT)
Dept: OTHER | Age: 35
End: 2020-11-24

## 2020-11-26 DIAGNOSIS — D50.9 IRON DEFICIENCY ANEMIA, UNSPECIFIED IRON DEFICIENCY ANEMIA TYPE: ICD-10-CM

## 2020-11-27 ENCOUNTER — HOSPITAL ENCOUNTER (OUTPATIENT)
Dept: INFUSION THERAPY | Age: 35
Discharge: HOME | End: 2020-11-27
Payer: COMMERCIAL

## 2020-11-27 VITALS
DIASTOLIC BLOOD PRESSURE: 65 MMHG | OXYGEN SATURATION: 98 % | TEMPERATURE: 99.2 F | HEART RATE: 78 BPM | SYSTOLIC BLOOD PRESSURE: 138 MMHG | RESPIRATION RATE: 18 BRPM

## 2020-11-27 DIAGNOSIS — D50.9 IRON DEFICIENCY ANEMIA, UNSPECIFIED IRON DEFICIENCY ANEMIA TYPE: Primary | ICD-10-CM

## 2020-11-27 PROCEDURE — 96365 THER/PROPH/DIAG IV INF INIT: CPT

## 2020-11-27 PROCEDURE — 74011250636 HC RX REV CODE- 250/636: Performed by: INTERNAL MEDICINE

## 2020-11-27 RX ORDER — SODIUM CHLORIDE 9 MG/ML
10 INJECTION INTRAMUSCULAR; INTRAVENOUS; SUBCUTANEOUS AS NEEDED
Status: ACTIVE | OUTPATIENT
Start: 2020-11-27 | End: 2020-11-27

## 2020-11-27 RX ORDER — DIPHENHYDRAMINE HYDROCHLORIDE 50 MG/ML
25 INJECTION, SOLUTION INTRAMUSCULAR; INTRAVENOUS AS NEEDED
Status: CANCELLED
Start: 2020-12-03

## 2020-11-27 RX ORDER — SODIUM CHLORIDE 9 MG/ML
25 INJECTION, SOLUTION INTRAVENOUS CONTINUOUS
Status: CANCELLED | OUTPATIENT
Start: 2020-12-03

## 2020-11-27 RX ORDER — ACETAMINOPHEN 325 MG/1
650 TABLET ORAL AS NEEDED
Status: CANCELLED
Start: 2020-12-03

## 2020-11-27 RX ORDER — ALBUTEROL SULFATE 0.83 MG/ML
2.5 SOLUTION RESPIRATORY (INHALATION) AS NEEDED
Status: CANCELLED
Start: 2020-12-03

## 2020-11-27 RX ORDER — SODIUM CHLORIDE 9 MG/ML
10 INJECTION INTRAMUSCULAR; INTRAVENOUS; SUBCUTANEOUS AS NEEDED
Status: CANCELLED | OUTPATIENT
Start: 2020-12-03

## 2020-11-27 RX ORDER — ONDANSETRON 2 MG/ML
8 INJECTION INTRAMUSCULAR; INTRAVENOUS AS NEEDED
Status: CANCELLED | OUTPATIENT
Start: 2020-12-03

## 2020-11-27 RX ORDER — EPINEPHRINE 1 MG/ML
0.3 INJECTION, SOLUTION, CONCENTRATE INTRAVENOUS AS NEEDED
Status: CANCELLED | OUTPATIENT
Start: 2020-12-03

## 2020-11-27 RX ORDER — HYDROCORTISONE SODIUM SUCCINATE 100 MG/2ML
100 INJECTION, POWDER, FOR SOLUTION INTRAMUSCULAR; INTRAVENOUS AS NEEDED
Status: CANCELLED | OUTPATIENT
Start: 2020-12-03

## 2020-11-27 RX ORDER — SODIUM CHLORIDE 9 MG/ML
25 INJECTION, SOLUTION INTRAVENOUS CONTINUOUS
Status: DISPENSED | OUTPATIENT
Start: 2020-11-27 | End: 2020-11-27

## 2020-11-27 RX ORDER — HEPARIN 100 UNIT/ML
300-500 SYRINGE INTRAVENOUS AS NEEDED
Status: CANCELLED
Start: 2020-12-03

## 2020-11-27 RX ORDER — DIPHENHYDRAMINE HYDROCHLORIDE 50 MG/ML
50 INJECTION, SOLUTION INTRAMUSCULAR; INTRAVENOUS AS NEEDED
Status: CANCELLED
Start: 2020-12-03

## 2020-11-27 RX ORDER — SODIUM CHLORIDE 0.9 % (FLUSH) 0.9 %
10 SYRINGE (ML) INJECTION AS NEEDED
Status: DISPENSED | OUTPATIENT
Start: 2020-11-27 | End: 2020-11-27

## 2020-11-27 RX ORDER — SODIUM CHLORIDE 0.9 % (FLUSH) 0.9 %
10 SYRINGE (ML) INJECTION AS NEEDED
Status: CANCELLED | OUTPATIENT
Start: 2020-12-03

## 2020-11-27 RX ADMIN — FERRIC CARBOXYMALTOSE INJECTION 750 MG: 50 INJECTION, SOLUTION INTRAVENOUS at 10:30

## 2020-11-27 RX ADMIN — Medication 10 ML: at 10:56

## 2020-11-27 RX ADMIN — Medication 10 ML: at 10:17

## 2020-11-27 RX ADMIN — SODIUM CHLORIDE 25 ML/HR: 9 INJECTION, SOLUTION INTRAVENOUS at 10:30

## 2020-12-03 DIAGNOSIS — D50.9 IRON DEFICIENCY ANEMIA, UNSPECIFIED IRON DEFICIENCY ANEMIA TYPE: ICD-10-CM

## 2020-12-15 ENCOUNTER — PATIENT OUTREACH (OUTPATIENT)
Dept: OTHER | Age: 35
End: 2020-12-15

## 2020-12-15 NOTE — PROGRESS NOTES
Resolving current episode of case management. Patient has met patient stated and/or medical goals. Patient with ovarian cyst, hernia and immune thrombocytopenic purpura, and pervasive endometriosis. *hx of endometriosis found to have right sided severe hydronephrosis, tortuous ureter and poor perfusion/ left kidney.   * Fe infusions for anemia. Patient consistenly demonstrates understanding of the medical action plan through execution of plan. Appointments with key providers are scheduled and attended. Plan of care is modified and updated to address new challenges and barriers with minimal support from the CM team(proactively uses physicians and community resources) The support system remains current and has been modified as needed. Patient continues to acquire needed resources from the current support system established. Teach back demonstrates that patient understands education for self management of chronic conditions. Patient consistenly communicates understanding of signs,symptoms and complications for all major diagnoses. Patient modifies her lifestyle toreduce or avoid risk factors to his/her health. ECM will follow as needed and patient has ECM contact information for future needs. 8:40am  Called patient for Care Management at agreed time. Left discreet voice mail with my contact information encourage a call back. 4:45pm  Incoming call. Verified  and address for HIPAA security. * Ms. Nawaf Soto is stable - manageable abd pain- no bad flares. * Will address surgery in  if symptoms worsen. For now- no plans to FU with Brandy Reeves.

## 2021-01-25 DIAGNOSIS — D69.6 THROMBOCYTOPENIA (HCC): ICD-10-CM

## 2021-01-25 DIAGNOSIS — D50.9 IRON DEFICIENCY ANEMIA, UNSPECIFIED IRON DEFICIENCY ANEMIA TYPE: Primary | ICD-10-CM

## 2021-01-27 ENCOUNTER — HOSPITAL ENCOUNTER (OUTPATIENT)
Dept: INFUSION THERAPY | Age: 36
Discharge: HOME | End: 2021-01-27
Payer: COMMERCIAL

## 2021-01-27 ENCOUNTER — TELEPHONE (OUTPATIENT)
Age: 36
End: 2021-01-27

## 2021-01-27 ENCOUNTER — HOSPITAL ENCOUNTER (INPATIENT)
Age: 36
LOS: 3 days | Discharge: HOME | DRG: 813 | End: 2021-01-31
Attending: STUDENT IN AN ORGANIZED HEALTH CARE EDUCATION/TRAINING PROGRAM | Admitting: INTERNAL MEDICINE
Payer: COMMERCIAL

## 2021-01-27 DIAGNOSIS — D50.9 IRON DEFICIENCY ANEMIA, UNSPECIFIED IRON DEFICIENCY ANEMIA TYPE: ICD-10-CM

## 2021-01-27 DIAGNOSIS — D69.6 THROMBOCYTOPENIA (HCC): ICD-10-CM

## 2021-01-27 DIAGNOSIS — D69.3 ACUTE ITP (HCC): Primary | ICD-10-CM

## 2021-01-27 DIAGNOSIS — D69.6 THROMBOCYTOPENIA (HCC): Primary | ICD-10-CM

## 2021-01-27 LAB
ABO + RH BLD: NORMAL
ANION GAP SERPL CALC-SCNC: 3 MMOL/L (ref 3–18)
APTT PPP: 24.3 SEC (ref 23–36.4)
BASOPHILS # BLD: 0 K/UL (ref 0–0.1)
BASOPHILS # BLD: 0 K/UL (ref 0–0.1)
BASOPHILS NFR BLD: 1 % (ref 0–2)
BASOPHILS NFR BLD: 1 % (ref 0–2)
BLOOD GROUP ANTIBODIES SERPL: NORMAL
BUN SERPL-MCNC: 10 MG/DL (ref 7–18)
BUN/CREAT SERPL: 9 (ref 12–20)
CALCIUM SERPL-MCNC: 8.7 MG/DL (ref 8.5–10.1)
CHLORIDE SERPL-SCNC: 106 MMOL/L (ref 100–111)
CO2 SERPL-SCNC: 27 MMOL/L (ref 21–32)
CREAT SERPL-MCNC: 1.07 MG/DL (ref 0.6–1.3)
DIFFERENTIAL METHOD BLD: ABNORMAL
DIFFERENTIAL METHOD BLD: ABNORMAL
EOSINOPHIL # BLD: 0 K/UL (ref 0–0.4)
EOSINOPHIL # BLD: 0 K/UL (ref 0–0.4)
EOSINOPHIL NFR BLD: 1 % (ref 0–5)
EOSINOPHIL NFR BLD: 1 % (ref 0–5)
ERYTHROCYTE [DISTWIDTH] IN BLOOD BY AUTOMATED COUNT: 13.4 % (ref 11.6–14.5)
ERYTHROCYTE [DISTWIDTH] IN BLOOD BY AUTOMATED COUNT: 13.5 % (ref 11.6–14.5)
FERRITIN SERPL-MCNC: 674 NG/ML (ref 8–388)
GLUCOSE SERPL-MCNC: 92 MG/DL (ref 74–99)
HCG SERPL QL: NEGATIVE
HCT VFR BLD AUTO: 36.4 % (ref 35–45)
HCT VFR BLD AUTO: 37.2 % (ref 35–45)
HGB BLD-MCNC: 11.6 G/DL (ref 12–16)
HGB BLD-MCNC: 11.7 G/DL (ref 12–16)
INR PPP: 0.9 (ref 0.8–1.2)
IRON SATN MFR SERPL: 17 % (ref 20–50)
IRON SERPL-MCNC: 45 UG/DL (ref 50–175)
LYMPHOCYTES # BLD: 1.9 K/UL (ref 0.9–3.6)
LYMPHOCYTES # BLD: 2.2 K/UL (ref 0.9–3.6)
LYMPHOCYTES NFR BLD: 47 % (ref 21–52)
LYMPHOCYTES NFR BLD: 49 % (ref 21–52)
MCH RBC QN AUTO: 27.2 PG (ref 24–34)
MCH RBC QN AUTO: 27.2 PG (ref 24–34)
MCHC RBC AUTO-ENTMCNC: 31.5 G/DL (ref 31–37)
MCHC RBC AUTO-ENTMCNC: 31.9 G/DL (ref 31–37)
MCV RBC AUTO: 85.4 FL (ref 74–97)
MCV RBC AUTO: 86.5 FL (ref 74–97)
MONOCYTES # BLD: 0.4 K/UL (ref 0.05–1.2)
MONOCYTES # BLD: 0.6 K/UL (ref 0.05–1.2)
MONOCYTES NFR BLD: 13 % (ref 3–10)
MONOCYTES NFR BLD: 9 % (ref 3–10)
NEUTS SEG # BLD: 1.5 K/UL (ref 1.8–8)
NEUTS SEG # BLD: 1.6 K/UL (ref 1.8–8)
NEUTS SEG NFR BLD: 36 % (ref 40–73)
NEUTS SEG NFR BLD: 42 % (ref 40–73)
PLATELET # BLD AUTO: 5 K/UL (ref 135–420)
PLATELET # BLD AUTO: 6 K/UL (ref 135–420)
PLATELET COMMENTS,PCOM: ABNORMAL
PLATELET COMMENTS,PCOM: ABNORMAL
POTASSIUM SERPL-SCNC: 3.8 MMOL/L (ref 3.5–5.5)
PROTHROMBIN TIME: 12 SEC (ref 11.5–15.2)
RBC # BLD AUTO: 4.26 M/UL (ref 4.2–5.3)
RBC # BLD AUTO: 4.3 M/UL (ref 4.2–5.3)
RBC MORPH BLD: ABNORMAL
RBC MORPH BLD: ABNORMAL
SODIUM SERPL-SCNC: 136 MMOL/L (ref 136–145)
SPECIMEN EXP DATE BLD: NORMAL
TIBC SERPL-MCNC: 268 UG/DL (ref 250–450)
WBC # BLD AUTO: 3.9 K/UL (ref 4.6–13.2)
WBC # BLD AUTO: 4.3 K/UL (ref 4.6–13.2)

## 2021-01-27 PROCEDURE — 85025 COMPLETE CBC W/AUTO DIFF WBC: CPT

## 2021-01-27 PROCEDURE — 85730 THROMBOPLASTIN TIME PARTIAL: CPT

## 2021-01-27 PROCEDURE — 96366 THER/PROPH/DIAG IV INF ADDON: CPT

## 2021-01-27 PROCEDURE — 99283 EMERGENCY DEPT VISIT LOW MDM: CPT

## 2021-01-27 PROCEDURE — 74011250636 HC RX REV CODE- 250/636: Performed by: HOSPITALIST

## 2021-01-27 PROCEDURE — 99218 HC RM OBSERVATION: CPT

## 2021-01-27 PROCEDURE — 36430 TRANSFUSION BLD/BLD COMPNT: CPT

## 2021-01-27 PROCEDURE — 83550 IRON BINDING TEST: CPT

## 2021-01-27 PROCEDURE — 80048 BASIC METABOLIC PNL TOTAL CA: CPT

## 2021-01-27 PROCEDURE — 86901 BLOOD TYPING SEROLOGIC RH(D): CPT

## 2021-01-27 PROCEDURE — P9035 PLATELET PHERES LEUKOREDUCED: HCPCS

## 2021-01-27 PROCEDURE — 85610 PROTHROMBIN TIME: CPT

## 2021-01-27 PROCEDURE — 36415 COLL VENOUS BLD VENIPUNCTURE: CPT

## 2021-01-27 PROCEDURE — 84703 CHORIONIC GONADOTROPIN ASSAY: CPT

## 2021-01-27 PROCEDURE — 2709999900 HC NON-CHARGEABLE SUPPLY

## 2021-01-27 PROCEDURE — 74011250637 HC RX REV CODE- 250/637: Performed by: HOSPITALIST

## 2021-01-27 PROCEDURE — 82728 ASSAY OF FERRITIN: CPT

## 2021-01-27 RX ORDER — PREDNISONE 10 MG/1
30 TABLET ORAL
Status: DISCONTINUED | OUTPATIENT
Start: 2021-02-06 | End: 2021-01-31 | Stop reason: HOSPADM

## 2021-01-27 RX ORDER — PROMETHAZINE HYDROCHLORIDE 25 MG/1
12.5 TABLET ORAL
Status: DISCONTINUED | OUTPATIENT
Start: 2021-01-27 | End: 2021-01-31 | Stop reason: HOSPADM

## 2021-01-27 RX ORDER — ONDANSETRON 2 MG/ML
4 INJECTION INTRAMUSCULAR; INTRAVENOUS
Status: DISCONTINUED | OUTPATIENT
Start: 2021-01-27 | End: 2021-01-31 | Stop reason: HOSPADM

## 2021-01-27 RX ORDER — SODIUM CHLORIDE 9 MG/ML
250 INJECTION, SOLUTION INTRAVENOUS AS NEEDED
Status: DISCONTINUED | OUTPATIENT
Start: 2021-01-27 | End: 2021-01-31 | Stop reason: HOSPADM

## 2021-01-27 RX ORDER — SODIUM CHLORIDE 0.9 % (FLUSH) 0.9 %
5-40 SYRINGE (ML) INJECTION AS NEEDED
Status: DISCONTINUED | OUTPATIENT
Start: 2021-01-27 | End: 2021-01-31 | Stop reason: HOSPADM

## 2021-01-27 RX ORDER — SODIUM CHLORIDE 0.9 % (FLUSH) 0.9 %
5-40 SYRINGE (ML) INJECTION EVERY 8 HOURS
Status: DISCONTINUED | OUTPATIENT
Start: 2021-01-27 | End: 2021-01-31 | Stop reason: HOSPADM

## 2021-01-27 RX ORDER — PREDNISONE 50 MG/1
50 TABLET ORAL
Status: DISCONTINUED | OUTPATIENT
Start: 2021-01-31 | End: 2021-01-31 | Stop reason: HOSPADM

## 2021-01-27 RX ORDER — PREDNISONE 5 MG/1
10 TABLET ORAL
Status: DISCONTINUED | OUTPATIENT
Start: 2021-02-12 | End: 2021-01-31 | Stop reason: HOSPADM

## 2021-01-27 RX ORDER — PREDNISONE 10 MG/1
60 TABLET ORAL
Status: COMPLETED | OUTPATIENT
Start: 2021-01-28 | End: 2021-01-30

## 2021-01-27 RX ORDER — PREDNISONE 5 MG/1
20 TABLET ORAL
Status: DISCONTINUED | OUTPATIENT
Start: 2021-02-09 | End: 2021-01-31 | Stop reason: HOSPADM

## 2021-01-27 RX ORDER — ACETAMINOPHEN 325 MG/1
325 TABLET ORAL
Status: DISCONTINUED | OUTPATIENT
Start: 2021-01-27 | End: 2021-01-31 | Stop reason: HOSPADM

## 2021-01-27 RX ORDER — POLYETHYLENE GLYCOL 3350 17 G/17G
17 POWDER, FOR SOLUTION ORAL DAILY PRN
Status: DISCONTINUED | OUTPATIENT
Start: 2021-01-27 | End: 2021-01-31 | Stop reason: HOSPADM

## 2021-01-27 RX ORDER — HYDROXYZINE HYDROCHLORIDE 10 MG/1
10 TABLET, FILM COATED ORAL
Status: DISCONTINUED | OUTPATIENT
Start: 2021-01-27 | End: 2021-01-28

## 2021-01-27 RX ADMIN — ACETAMINOPHEN 325 MG: 325 TABLET, FILM COATED ORAL at 23:48

## 2021-01-27 RX ADMIN — IMMUNE GLOBULIN (HUMAN) 20 G: 10 INJECTION INTRAVENOUS; SUBCUTANEOUS at 21:22

## 2021-01-27 RX ADMIN — Medication 10 ML: at 22:12

## 2021-01-27 NOTE — ED TRIAGE NOTES
Pt arrives through triage, was seen at her dr's office and sent here for an infusion. Platelets reportedly low. Pt was seen there for bruising and bleeding.

## 2021-01-27 NOTE — ED PROVIDER NOTES
77-year-old female with past medical history significant for ITP was sent to the ER by her oncologist office for significant thrombocytopenia. Patient endorses intermittent nosebleeds over the past 3 days as well as bruising over her bilateral lower extremities but has no other complaints. Her epistaxis is controlled with manual pressure. She had blood work that was drawn as outpatient today that showed a platelet count of 3. She denies any fevers, chills, chest pain, shortness of breath or any other complaints. She does not smoke cigarettes, drink alcohol or use recreational drugs. Past Medical History:   Diagnosis Date    Hydronephrosis     Idiopathic thrombocytopenic purpura (ITP) (HCC)     Leukopenia     Ovarian cyst     Thrombocytopenia (HCC)        Past Surgical History:   Procedure Laterality Date    HX COLONOSCOPY      HX NEPHRECTOMY  06/27/2019    Laparoscopic right nephrectomy    HX OTHER SURGICAL       CYSTOSCOPY, WITH RIGHT RETROGRADE PYELOGRAM AND RIGHT URETERAL STENT REPLACEMENT         Family History:   Problem Relation Age of Onset    Hypertension Mother        Social History     Socioeconomic History    Marital status: SINGLE     Spouse name: Not on file    Number of children: Not on file    Years of education: Not on file    Highest education level: Not on file   Occupational History    Not on file   Social Needs    Financial resource strain: Not on file    Food insecurity     Worry: Not on file     Inability: Not on file    Transportation needs     Medical: Not on file     Non-medical: Not on file   Tobacco Use    Smoking status: Never Smoker    Smokeless tobacco: Never Used   Substance and Sexual Activity    Alcohol use:  Yes    Drug use: Never    Sexual activity: Yes   Lifestyle    Physical activity     Days per week: Not on file     Minutes per session: Not on file    Stress: Not on file   Relationships    Social connections     Talks on phone: Not on file Gets together: Not on file     Attends [employer] service: Not on file     Active member of club or organization: Not on file     Attends meetings of clubs or organizations: Not on file     Relationship status: Not on file    Intimate partner violence     Fear of current or ex partner: Not on file     Emotionally abused: Not on file     Physically abused: Not on file     Forced sexual activity: Not on file   Other Topics Concern    Not on file   Social History Narrative    Not on file         ALLERGIES: Patient has no known allergies. Review of Systems   Constitutional: Negative for activity change and appetite change. HENT: Positive for nosebleeds. Negative for drooling and facial swelling. Eyes: Negative for pain and discharge. Respiratory: Negative for apnea and choking. Cardiovascular: Negative for palpitations and leg swelling. Gastrointestinal: Negative for blood in stool and rectal pain. Endocrine: Negative for polydipsia and polyphagia. Genitourinary: Negative for genital sores and hematuria. Musculoskeletal: Negative for gait problem and neck stiffness. Skin: Negative for color change and rash. Allergic/Immunologic: Negative for environmental allergies. Neurological: Negative for tremors. Hematological: Negative for adenopathy. Bruises/bleeds easily. Psychiatric/Behavioral: Negative for agitation and behavioral problems. Vitals:    01/27/21 1607   BP: (!) 159/106   Pulse: 64   Resp: 18   Temp: 98 °F (36.7 °C)   SpO2: 100%   Weight: 63.5 kg (140 lb)   Height: 5' 2\" (1.575 m)            Physical Exam  Vitals signs and nursing note reviewed. Constitutional:       Appearance: Normal appearance. HENT:      Head: Normocephalic and atraumatic. Nose: Nose normal.      Comments: No active bleeding  Eyes:      Extraocular Movements: Extraocular movements intact. Pupils: Pupils are equal, round, and reactive to light.    Neck:      Musculoskeletal: Normal range of motion. Cardiovascular:      Rate and Rhythm: Normal rate and regular rhythm. Heart sounds: Normal heart sounds. No murmur. No friction rub. Pulmonary:      Effort: Pulmonary effort is normal.      Breath sounds: Normal breath sounds. Abdominal:      Palpations: Abdomen is soft. Musculoskeletal: Normal range of motion. Comments: Mild ecchymosis to anterior B/L lower extremities   Skin:     General: Skin is warm and dry. Capillary Refill: Capillary refill takes less than 2 seconds. Neurological:      General: No focal deficit present. Mental Status: She is alert and oriented to person, place, and time. Psychiatric:         Mood and Affect: Mood normal.          MDM  Number of Diagnoses or Management Options  Acute ITP ([facility]  Thrombocytopenia ([facility]  Diagnosis management comments: 53-year-old female with history of ITP sent here from her oncologist office for blood work drawn today that showed platelets of 3. Case was discussed on the phone with her oncologist, Dr. Axel Frankel, who recommended initiation of IV steroids, platelet infusion and admission for IVIG. Full laboratory work-up ordered. Will reassess. Laboratory work-up significant for thrombocytopenia to 5. She is also chronically anemic which is not significantly changed from baseline. Coags normal.  Pregnancy test negative. 30mg/kg bolus of IV solumedrol and platelet transfusion ordered. Case discussed with the hospitalist, Dr. Terrie Louie, who accepted admission for ITP requiring platelet transfusion and initiation of IVIG therapy. CRITICAL CARE:  7:53 PM  I have spent 36 minutes of critical care time involved in lab review, consultations with specialist, family decision-making, and documentation. This time does not include separately billable procedures. During this entire length of time I was immediately available to the patient. Critical Care:   The reason for providing this level of medical care for this critically ill patient was due a critical illness that impaired one or more vital organ systems such that there was a high probability of imminent or life threatening deterioration in the patients condition. This care involved high complexity decision making to assess, manipulate, and support vital system functions, to treat this degreee vital organ system failure and to prevent further life threatening deterioration of the patients condition.     Regina Caraballores, DO             Procedures

## 2021-01-27 NOTE — TELEPHONE ENCOUNTER
Advised by phlebotomist Erin Duncan of critical labs: Platelets 3 K/uL    Advised Madyson Roman NP of labs. Nurse Practitioner [address] and I spoke with the patient in our waiting room. Patient reports bruising of lower extremities and nose bleeds for the last few days. Patient advised to go to [facility] ER. She verbalized understanding. Called [facility] ER and spoke with Jc Burr. Amanda Citizen we are sending the patient over and gave report. Jc Burr verbalized understanding.

## 2021-01-27 NOTE — PROGRESS NOTES
[address] [facility] Progress Note    Date: 2021    Name: Shawn Ho    MRN: [id number]         : 1985    Peripheral Lab Draw      Ms. Lilly to [facility], ambulatory at [phone] accompanied by self. Pt was assessed and education was provided. Blood obtained peripherally from left arm x 1 attempt with butterfly needle and sent to lab for Cbc w/diff, Iron Profile and Ferritin per written orders. No bleeding or hematoma noted at site. Gauze and coban applied. Ms. Marek Albert tolerated the phlebotomy, and had no complaints. Patient armband removed and shredded. Ms. Marek Albert was discharged from [place] [address] in stable condition at [phone].      Bailee Gordillo Phlebotomist PCT  2021  4:03 PM

## 2021-01-28 LAB
BLD PROD TYP BPU: NORMAL
BPU ID: NORMAL
CALLED TO:,BCALL1: NORMAL
ERYTHROCYTE [DISTWIDTH] IN BLOOD BY AUTOMATED COUNT: 13.2 % (ref 11.6–14.5)
ERYTHROCYTE [DISTWIDTH] IN BLOOD BY AUTOMATED COUNT: 13.2 % (ref 11.6–14.5)
HCT VFR BLD AUTO: 34.3 % (ref 35–45)
HCT VFR BLD AUTO: 38.5 % (ref 35–45)
HGB BLD-MCNC: 11 G/DL (ref 12–16)
HGB BLD-MCNC: 12.4 G/DL (ref 12–16)
MCH RBC QN AUTO: 27.3 PG (ref 24–34)
MCH RBC QN AUTO: 27.4 PG (ref 24–34)
MCHC RBC AUTO-ENTMCNC: 32.1 G/DL (ref 31–37)
MCHC RBC AUTO-ENTMCNC: 32.2 G/DL (ref 31–37)
MCV RBC AUTO: 85.1 FL (ref 74–97)
MCV RBC AUTO: 85.2 FL (ref 74–97)
PLATELET # BLD AUTO: 13 K/UL (ref 135–420)
PLATELET # BLD AUTO: 19 K/UL (ref 135–420)
RBC # BLD AUTO: 4.03 M/UL (ref 4.2–5.3)
RBC # BLD AUTO: 4.52 M/UL (ref 4.2–5.3)
STATUS OF UNIT,%ST: NORMAL
UNIT DIVISION, %UDIV: 0
WBC # BLD AUTO: 3.2 K/UL (ref 4.6–13.2)
WBC # BLD AUTO: 3.7 K/UL (ref 4.6–13.2)

## 2021-01-28 PROCEDURE — 74011250637 HC RX REV CODE- 250/637: Performed by: HOSPITALIST

## 2021-01-28 PROCEDURE — 77030038269 HC DRN EXT URIN PURWCK BARD -A

## 2021-01-28 PROCEDURE — 77030021352 HC CBL LD SYS DISP COVD -B

## 2021-01-28 PROCEDURE — 2709999900 HC NON-CHARGEABLE SUPPLY

## 2021-01-28 PROCEDURE — 74011250636 HC RX REV CODE- 250/636

## 2021-01-28 PROCEDURE — 96375 TX/PRO/DX INJ NEW DRUG ADDON: CPT

## 2021-01-28 PROCEDURE — 65660000000 HC RM CCU STEPDOWN

## 2021-01-28 PROCEDURE — 99221 1ST HOSP IP/OBS SF/LOW 40: CPT | Performed by: INTERNAL MEDICINE

## 2021-01-28 PROCEDURE — 36415 COLL VENOUS BLD VENIPUNCTURE: CPT

## 2021-01-28 PROCEDURE — 99218 HC RM OBSERVATION: CPT

## 2021-01-28 PROCEDURE — 74011250636 HC RX REV CODE- 250/636: Performed by: INTERNAL MEDICINE

## 2021-01-28 PROCEDURE — 74011636637 HC RX REV CODE- 636/637: Performed by: HOSPITALIST

## 2021-01-28 PROCEDURE — 85027 COMPLETE CBC AUTOMATED: CPT

## 2021-01-28 RX ORDER — TRAMADOL HYDROCHLORIDE 50 MG/1
50 TABLET ORAL
Status: DISCONTINUED | OUTPATIENT
Start: 2021-01-28 | End: 2021-01-28

## 2021-01-28 RX ORDER — NALOXONE HYDROCHLORIDE 0.4 MG/ML
0.4 INJECTION, SOLUTION INTRAMUSCULAR; INTRAVENOUS; SUBCUTANEOUS AS NEEDED
Status: DISCONTINUED | OUTPATIENT
Start: 2021-01-28 | End: 2021-01-31 | Stop reason: HOSPADM

## 2021-01-28 RX ORDER — LANOLIN ALCOHOL/MO/W.PET/CERES
9 CREAM (GRAM) TOPICAL
Status: DISCONTINUED | OUTPATIENT
Start: 2021-01-29 | End: 2021-01-31 | Stop reason: HOSPADM

## 2021-01-28 RX ORDER — OXYCODONE AND ACETAMINOPHEN 5; 325 MG/1; MG/1
1 TABLET ORAL
Status: DISCONTINUED | OUTPATIENT
Start: 2021-01-28 | End: 2021-01-31 | Stop reason: HOSPADM

## 2021-01-28 RX ADMIN — TRAMADOL HYDROCHLORIDE 50 MG: 50 TABLET, FILM COATED ORAL at 21:05

## 2021-01-28 RX ADMIN — IMMUNE GLOBULIN (HUMAN) 20 G: 10 INJECTION INTRAVENOUS; SUBCUTANEOUS at 21:10

## 2021-01-28 RX ADMIN — Medication 10 ML: at 06:33

## 2021-01-28 RX ADMIN — Medication 10 ML: at 16:33

## 2021-01-28 RX ADMIN — Medication 10 ML: at 14:13

## 2021-01-28 RX ADMIN — PREDNISONE 60 MG: 10 TABLET ORAL at 08:30

## 2021-01-28 NOTE — ED NOTES
Walked down to pharmacy to try and  pt's ordered medications for a second time and was again told that they do not yet have them prepared. Gentleman at the window did state that they would hand deliver them to the unit for the patient since she is getting admitted.

## 2021-01-28 NOTE — PROGRESS NOTES
Medicine History and Physical    Patient: Felix Hall   Age:  37 y.o. Assessment   Principal Problem:    [facility] (1/27/2021)    Active Problems: Thrombocytopenia ([facility] (10/13/2020)          Plan     ITP/ thombocytopenia   - heme onc consulted   - steriods, IVIG   - platelet transfuse   - monitor for bleeding   - tele      DISPO    Anticipated Date of Discharge: 3 days  Anticipated Disposition (home, SNF) : home    Chief Complaint:   Chief Complaint   Patient presents with    Abnormal Lab Results         HPI:   Felix Hall is a 37y.o. year old female who presents with  Thrombocytopenia. Patient presents from oncologist office with findings of platelet count of 9483. She has hx of ITP. Heme onc physician contacted and recommended IVIG steriod and admission. Orders placed by heme onc for medications. Patient will be admitted for observation and ongoing medical treatment. Patient had multiple occurrences last yr but this is the lowest platelet count she has had      Review of Systems - positive responses in bold type   Constitutional: Negative for fever, chills, diaphoresis and unexpected weight change. HENT: Negative for ear pain, congestion, sore throat, rhinorrhea, drooling, trouble swallowing, neck pain and tinnitus. Eyes: Negative for photophobia, pain, redness and visual disturbance. Respiratory: negative for shortness of breath, cough, choking, chest tightness, wheezing or stridor. Cardiovascular: Negative for chest pain, palpitations and leg swelling. Gastrointestinal: Negative for nausea, vomiting, abdominal pain, diarrhea, constipation, blood in stool, abdominal distention and anal bleeding. Genitourinary: Negative for dysuria, urgency, frequency, hematuria, flank pain and difficulty urinating. Musculoskeletal: Negative for back pain and arthralgias. Skin: Negative for color change, rash and wound.    Neurological: Negative for dizziness, seizures, syncope, speech difficulty, light-headedness or headaches. Hematological: Does not bruise/bleed easily. Psychiatric/Behavioral: Negative for suicidal ideas, hallucinations, behavioral problems, self-injury or agitation       Past Medical History:  Past Medical History:   Diagnosis Date    Hydronephrosis     Idiopathic thrombocytopenic purpura (ITP) (HCC)     Leukopenia     Ovarian cyst     Thrombocytopenia (HCC)        Past Surgical History:  Past Surgical History:   Procedure Laterality Date    HX COLONOSCOPY      HX NEPHRECTOMY  06/27/2019    Laparoscopic right nephrectomy    HX OTHER SURGICAL       CYSTOSCOPY, WITH RIGHT RETROGRADE PYELOGRAM AND RIGHT URETERAL STENT REPLACEMENT       Family History:  Family History   Problem Relation Age of Onset    Hypertension Mother        Social History:  Social History     Socioeconomic History    Marital status: SINGLE     Spouse name: Not on file    Number of children: Not on file    Years of education: Not on file    Highest education level: Not on file   Tobacco Use    Smoking status: Never Smoker    Smokeless tobacco: Never Used   Substance and Sexual Activity    Alcohol use: Yes    Drug use: Never    Sexual activity: Yes       Home Medications:  Prior to Admission medications    Medication Sig Start Date End Date Taking? Authorizing Provider   predniSONE (DELTASONE) 20 mg tablet Slow taper: Take 60mg x 3 days then 50mg x 3days then 40mg x 3 days then 30mg x 3 days the 20mg x 3 days then 10mg x 3 days then discontinue. Use with 10mg tabs 10/21/20   Chery Naqvi NP   predniSONE (DELTASONE) 10 mg tablet Take 10 mg by mouth daily (with breakfast). Slow taper: Take 60mg x 3 days then 50mg x 3days then 40mg x 3 days then 30mg x 3 days the 20mg x 3 days then 10mg x 3 days then discontinue.  Use with 20mg tabs 10/21/20   Osiris Naqvi NP   oxyCODONE-acetaminophen (PERCOCET) 5-325 mg per tablet TAKE 1 TABLET BY MOUTH EVERY 4 HOURS AS NEEDED (DO NOT EXCEED 4000MG OF ACETAMINOPHEN PER DAY.) 7/19/20   Provider, Historical   VITAMIN D2 50,000 unit capsule take 1 capsule by mouth every week for 4 weeks 8/28/19   Provider, Historical   hydrOXYzine HCl (ATARAX) 10 mg tablet Take 10 mg by mouth two (2) times daily as needed. 6/5/19   Provider, Historical   acetaminophen (TYLENOL) 325 mg tablet Take  by mouth every four (4) hours as needed for Pain. Provider, Historical       Allergies:  No Known Allergies        Physical Exam:     Visit Vitals  /82 (BP 1 Location: Right arm)   Pulse 60   Temp 98.5 °F (36.9 °C)   Resp 16   Ht 5' 2\" (1.575 m)   Wt 63.5 kg (140 lb)   SpO2 99%   BMI 25.61 kg/m²       Physical Exam:  General appearance: alert, cooperative, no distress, appears stated age  Head: Normocephalic, without obvious abnormality, atraumatic  Neck: supple, trachea midline  Lungs: clear to auscultation bilaterally  Heart: regular rate and rhythm, S1, S2 normal, no murmur, click, rub or gallop  Abdomen: soft, non-tender. Bowel sounds normal. No masses,  no organomegaly  Extremities: extremities normal, atraumatic, no cyanosis or edema  Skin: Skin color, texture, turgor normal. No rashes or lesions  Neurologic: Grossly normal      Intake and Output:  Current Shift:  No intake/output data recorded. Last three shifts:  No intake/output data recorded.     Lab/Data Reviewed:  CMP:   Lab Results   Component Value Date/Time     01/27/2021 04:28 PM    K 3.8 01/27/2021 04:28 PM     01/27/2021 04:28 PM    CO2 27 01/27/2021 04:28 PM    AGAP 3 01/27/2021 04:28 PM    GLU 92 01/27/2021 04:28 PM    BUN 10 01/27/2021 04:28 PM    CREA 1.07 01/27/2021 04:28 PM    GFRAA >60 01/27/2021 04:28 PM    GFRNA 59 (L) 01/27/2021 04:28 PM    CA 8.7 01/27/2021 04:28 PM     CBC:   Lab Results   Component Value Date/Time    WBC 4.3 (L) 01/27/2021 04:28 PM    HGB 11.7 (L) 01/27/2021 04:28 PM    HCT 37.2 01/27/2021 04:28 PM    PLT 5 (LL) 01/27/2021 04:28 PM     All Cardiac Markers in the last 24 hours:  No results found for: CPK, CK, CKMMB, CKMB, RCK3, CKMBT, CKNDX, CKND1, TYRA, TROPT, TROIQ, [place], TROPT, TNIPOC, BNP, BNPP      Hadley Hardy MD    January 27, 2021

## 2021-01-28 NOTE — CONSULTS
[facility]  [id number] [place] [address], [address]  [facility]  Office Phone: [phone]  Fax: [phone]      Reason for visit:  Abnormal Lab Results      HPI:   Christina Toth is a 37 y.o.  female who I was asked to see in consultation at the request of Dr. Tiffanie Marie for evaluation for severe thrombocytopenia from ITP. Mrs. [address] is well-known to me. She has history of ITP. She presented yesterday in clinic with complaint of large thigh ecchymosis and epistaxis. I checked CBC and platelet came back at 5000. Patient was then sent to ER to be admitted for platelets, prednisone and IVIG. Today on review of system only complaint is chronic back pain. The large ecchymosis on her thigh is getting smaller. No new ecchymosis. Has mild petechiae. No overt petechiae. Epistaxis has resolved. No melena or bright red blood per rectum. All other point of review of system have been reviewed and were negative. DX   Encounter Diagnoses   Name Primary?  Acute ITP (HCC) Yes    Thrombocytopenia (HCC)         Past Medical History:   Diagnosis Date    Hydronephrosis     Idiopathic thrombocytopenic purpura (ITP) (HCC)     Leukopenia     Ovarian cyst     Thrombocytopenia (HCC)      Past Surgical History:   Procedure Laterality Date    HX COLONOSCOPY      HX NEPHRECTOMY  06/27/2019    Laparoscopic right nephrectomy    HX OTHER SURGICAL       CYSTOSCOPY, WITH RIGHT RETROGRADE PYELOGRAM AND RIGHT URETERAL STENT REPLACEMENT     Social History     Socioeconomic History    Marital status: SINGLE     Spouse name: Not on file    Number of children: Not on file    Years of education: Not on file    Highest education level: Not on file   Tobacco Use    Smoking status: Never Smoker    Smokeless tobacco: Never Used   Substance and Sexual Activity    Alcohol use:  Yes    Drug use: Never    Sexual activity: Yes     Family History   Problem Relation Age of Onset  Hypertension Mother        Current Facility-Administered Medications   Medication Dose Route Frequency Provider Last Rate Last Admin    0.9% sodium chloride infusion 250 mL  250 mL IntraVENous PRN Ceasar James MD        immune globulin 10% infusion 20 g  20 g IntraVENous DAILY Ceasar James MD   Stopped at 01/27/21 2354    predniSONE (DELTASONE) tablet 60 mg  60 mg Oral DAILY WITH Yvan Silva MD   60 mg at 01/28/21 0830    acetaminophen (TYLENOL) tablet 325 mg  325 mg Oral Q4H PRN Ceasar James MD   325 mg at 01/27/21 2348    hydrOXYzine HCL (ATARAX) tablet 10 mg  10 mg Oral TID PRN Ceasar James MD        sodium chloride (NS) flush 5-40 mL  5-40 mL IntraVENous Q8H Ceasar James MD   10 mL at 01/28/21 6485    sodium chloride (NS) flush 5-40 mL  5-40 mL IntraVENous PRN Ceasar James MD        polyethylene glycol [facility]) packet 17 g  17 g Oral DAILY PRN Ceasar James MD        promethazine (PHENERGAN) tablet 12.5 mg  12.5 mg Oral Q6H PRN Ceasar James MD        Or    ondansetron [facility]) injection 4 mg  4 mg IntraVENous Q6H PRN Ceasar James MD        [facility] ON 1/31/2021] predniSONE (DELTASONE) tablet 50 mg  50 mg Oral DAILY WITH BREAKFAST Teresa Vigil MD       [facility] ON 2/3/2021] predniSONE (DELTASONE) tablet 40 mg  40 mg Oral DAILY WITH BREAKFAST Teresa Vigil MD       [facility] ON 2/6/2021] predniSONE (DELTASONE) tablet 30 mg  30 mg Oral DAILY WITH BREAKFAST Teresa Vigil MD       [facility] ON 2/9/2021] predniSONE (DELTASONE) tablet 20 mg  20 mg Oral DAILY WITH BREAKFAST Teresa Vigil MD       [facility] ON 2/12/2021] predniSONE (DELTASONE) tablet 10 mg  10 mg Oral DAILY WITH BREAKFAST Teresa Vigil MD           No Known Allergies    Review of Systems  As per HPI    Objective:  Physical Exam:  Visit Vitals  BP (!) 141/92 (BP 1 Location: Left arm, BP Patient Position: At rest)   Pulse 80   Temp 98.1 °F (36.7 °C)   Resp 18   Ht 5' 2\" (1.575 m) Wt 66.2 kg (145 lb 15.1 oz)   LMP 2021 (Exact Date)   SpO2 100%   BMI 26.69 kg/m²       General:  Alert, cooperative, no distress, appears stated age. Head:  Normocephalic, without obvious abnormality, atraumatic. Eyes:  Conjunctivae/corneas clear. PERRL, EOMs intact. Throat: Lips, mucosa, and tongue normal.    Neck: Supple, symmetrical, trachea midline, no adenopathy, thyroid: no enlargement/tenderness/nodules   Back:   Symmetric, no curvature. ROM normal. No CVA tenderness. Lungs:   Clear to auscultation bilaterally. Chest wall:  No tenderness or deformity. Heart:  Regular rate and rhythm, S1, S2 normal, no murmur, click, rub or gallop. Abdomen:   Soft, non-tender. Bowel sounds normal. No masses,  No organomegaly. Extremities: Extremities normal, atraumatic, no cyanosis or edema. Skin: Skin color, texture, turgor normal. No rashes or lesions. Lymph nodes: Cervical, supraclavicular, and axillary nodes normal.   Neurologic: CNII-XII intact. Diagnostic Imaging     Results for orders placed in visit on 19   CT ABD PELV W CONT    Impression [facility] Cat Scan  [address] [place] [address]  [phone]      Imaging Result     Name:    Haven Calderon ([id number])  Sex: Female :  1985 Ordering Provider: Flor RODRIGUEZ Provider:   Diagnosis:     Abd pain, RLQ, appendicitis suspected  Procedures Performed:  CT ABD/PELVIS-IV ONLY  [id number] Exam Date/Time:  2019  3:59 AM              EXAM: CT ABDOMEN AND PELVIS WITH CONTRAST     CLINICAL INDICATION/HISTORY: Abd pain, RLQ, appendicitis suspected .  Lower abdominal pain and dysuria.      COMPARISON: CT abdomen/pelvis 2013     TECHNIQUE:  CT abdomen and pelvis with 100 cc of Omnipaque 350 IV contrast.  All CT scans at this facility are performed using dose optimization technique as appropriate to the performed examination, to include automated exposure control, adjustment of the mA and/or kV according to patient's size (including appropriate matching for site-specific examinations), or use of an iterative reconstruction technique.     FINDINGS:   Lower chest: Minimal bibasilar atelectasis. Small hiatal hernia.     Liver: Negative.      Biliary: Negative.     Pancreas: Negative.     Spleen: Negative.     Adrenal glands: Negative.     Kidneys: Severe right hydronephrosis and hydroureter. No obstructing stone or mass is seen. The left kidney is unremarkable.     Stomach, Small Bowel and Colon: Diverticulosis. No evidence for diverticulitis. No small bowel obstruction. Normal appendix.     Bladder and Pelvic Organs:     Lymph nodes: No lymphadenopathy.      Vessels: Unremarkable for age.      Peritoneal Spaces: No free fluid or free air.     Body wall: Negative.     Bones: Unremarkable for age.     IMPRESSION      1. Severe right hydronephrosis and hydroureter to the level of the bladder, without clear etiology. Cortical thinning of the right kidney suggests this may be chronic.  Recommend urologic evaluation and nonemergent CT urogram.     A notification that a wet reading is available was posted to the ED trackboard at 04:13 AM on 4/11/2019.     Dictated By: Maria Guadalupe Bashir on 4/11/2019 4:15 AM     As attending radiologist, I have assessed the study images, and dictated or reviewed/edited the final report as needed.     Signed By: Renard Dunne MD on 4/11/2019 4:24 AM           Dictated by: Arabella Kay on Thu Apr 11, 2019  4:13:56 AM EDT  Signed By:Brinda Marie MD   4/11/2019  4:24 AM               Lab Results  Lab Results   Component Value Date/Time    WBC 3.2 (L) 01/28/2021 03:05 AM    HGB 11.0 (L) 01/28/2021 03:05 AM    HCT 34.3 (L) 01/28/2021 03:05 AM    PLATELET 13 (LL) 12/40/9276 03:05 AM    MCV 85.1 01/28/2021 03:05 AM       Lab Results   Component Value Date/Time    Sodium 136 01/27/2021 04:28 PM    Potassium 3.8 01/27/2021 04:28 PM    Chloride 106 01/27/2021 04:28 PM    CO2 27 01/27/2021 04:28 PM    Anion gap 3 01/27/2021 04:28 PM    Glucose 92 01/27/2021 04:28 PM    BUN 10 01/27/2021 04:28 PM    Creatinine 1.07 01/27/2021 04:28 PM    BUN/Creatinine ratio 9 (L) 01/27/2021 04:28 PM    GFR est AA >60 01/27/2021 04:28 PM    GFR est non-AA 59 (L) 01/27/2021 04:28 PM    Calcium 8.7 01/27/2021 04:28 PM    Alk. phosphatase 48 10/13/2020 03:49 PM    Protein, total 8.2 10/13/2020 03:49 PM    Albumin 3.4 10/13/2020 03:49 PM    Globulin 4.8 (H) 10/13/2020 03:49 PM    A-G Ratio 0.7 (L) 10/13/2020 03:49 PM    ALT (SGPT) 20 10/13/2020 03:49 PM     Assessment/Plan:  37 y.o. female with   1. Immune thrombocytopenia: Patient has no history of immune thrombocytopenia. I discussed with Dr. Jessica Ware yesterday and recommendation and plan as follow:  *Prednisone 1 mg/kg taper every 3 days by 10 mg. Starting dose 60 mg. *IVIG 400 mg/kg every 24 hours for 4 doses. She received the first dose last night. *Daily CBC. Platelets today AM  were 13,000. No need to transfuse platelets unless anita bleeding. Continue treatment as above. Transfuse platelets only if 97,718 or less or if bleeding. Thank  you for the consult.

## 2021-01-28 NOTE — ED NOTES
TRANSFER - ED to INPATIENT REPORT:    Verbal report given to DEWAYNE Vergara (name) on Krissy Gonzalez  being transferred to 2202 (unit) for routine progression of care       Report consisted of patients Situation, Background, Assessment and   Recommendations(SBAR). SBAR report made available to receiving floor on this patient being transferred to [address] [place] [address] (2200)  for routine progression of care       Admitting diagnosis Thrombocytopenia ([facility] [D69.6]  Acute ITP ([facility] [D69.3]    Information from the following report(s) SBAR, Kardex, ED Summary, MAR and Recent Results was made available to receiving floor. Lines:   Peripheral IV 01/27/21 Right Antecubital (Active)   Site Assessment Clean, dry, & intact 01/27/21 1641   Phlebitis Assessment 0 01/27/21 1641   Infiltration Assessment 0 01/27/21 1641   Dressing Status Clean, dry, & intact 01/27/21 1641   Dressing Type Transparent 01/27/21 1641   Hub Color/Line Status Pink; Infiltrated;Patent 01/27/21 1641   Action Taken Blood drawn 01/27/21 1641   Alcohol Cap Used No 01/27/21 1641        HCG Status for ALL Females under 53 y/o: YES     Medication list updated today    Opportunity for questions and clarification was provided. Patient is oriented to time, place, person and situation.   Patient is  continent and ambulatory without assist     Valuables transported with patient     Patient transported with:   Registered Nurse      =Monitored (most recent)  Vitals w/ MEWS Score (last day)     Date/Time MEWS Score Pulse Resp Temp BP Level of Consciousness SpO2    01/27/21 1607  1  64  18  98 °F (36.7 °C)  (!) 159/106  Alert  100 %

## 2021-01-28 NOTE — ROUTINE PROCESS
TRANSFER - IN REPORT: 
 
Verbal and bedside report received from [address], RN(name) on Camilo Bautista  being received from ED (unit) for routine progression of care Report consisted of patients Situation, Background, Assessment and  
Recommendations(SBAR). Information from the following report(s) SBAR, Kardex, ED Summary, Procedure Summary, Intake/Output, MAR, Recent Results and Cardiac Rhythm NSR was reviewed with the receiving nurse. Opportunity for questions and clarification was provided. Assessment completed upon patients arrival to unit and care assumed. Skin intact. Dual skin assessment completed with [address], RN. Noted ecchymosis to bilateral lower extremities. Patient alert and oriented times four. Ambulates well without assistive devices. Immune globin started in right antecubital 20 gauge PIV. Flushed and patient. Patient tolerating titration well.  
 
Zoë Mclain RN, BSN

## 2021-01-28 NOTE — PROGRESS NOTES
Problem: Discharge Planning  Goal: *Discharge to safe environment  Outcome: Progressing Towards Goal   Plan home    Reason for Admission:   Acute ITP                   RUR Score:     n/a                Plan for utilizing home health:    no      PCP: First and Last name:  none   Name of Practice:    Are you a current patient: Yes/No:    Approximate date of last visit:    Can you participate in a virtual visit with your PCP:                     Current Advanced Directive/Advance Care Plan: none                         Transition of Care Plan:   Spoke with pt, lves alone. Independent with adls and amb. Demographics correct. No dme  No pcp will need appoint at dc. Pt changed to inpt. She will transport self home. Patient has designated _____mother___________________ to participate in his/her discharge plan and to receive any needed information. Name: Carmel Has  [address] [place] [address] Road  Address:  Phone number:                     Care Management Interventions  PCP Verified by CM: Yes  Palliative Care Criteria Met (RRAT>21 & CHF Dx)?: No  Mode of Transport at Discharge:  Other (see comment)  Transition of Care Consult (CM Consult): Discharge Planning  Discharge Durable Medical Equipment: No  Physical Therapy Consult: No  Occupational Therapy Consult: No  Speech Therapy Consult: No  Current Support Network: Lives Alone  Confirm Follow Up Transport: Family  The Patient and/or Patient Representative was Provided with a Choice of Provider and Agrees with the Discharge Plan?: No  Freedom of Choice List was Provided with Basic Dialogue that Supports the Patient's Individualized Plan of Care/Goals, Treatment Preferences and Shares the Quality Data Associated with the Providers?: No  Discharge Location  Discharge Placement: Home

## 2021-01-28 NOTE — PROGRESS NOTES
Problem: Pain  Goal: *Control of Pain  Outcome: Progressing Towards Goal     Problem: Falls - Risk of  Goal: *Absence of Falls  Description: Document Joe Fall Risk and appropriate interventions in the flowsheet.   Outcome: Progressing Towards Goal  Note: Fall Risk Interventions:                                Problem: Discharge Planning  Goal: *Discharge to safe environment  Outcome: Progressing Towards Goal  Goal: *Knowledge of medication management  Outcome: Progressing Towards Goal  Goal: *Knowledge of discharge instructions  Outcome: Progressing Towards Goal

## 2021-01-28 NOTE — ROUTINE PROCESS
Patient complaint of headache. Stated that she has had headache all day since she came to the ED. Medicated with tylenol 325 mg as ordered. Will reassess in an hour. Noted patient is able to unplug infusion pump and ambulate to the bathroom unassisted. No unsteady gait noted. Platelets still infusing at this time.  
 
Katherine Allen, RN, BSN

## 2021-01-28 NOTE — PROGRESS NOTES
Problem: Pain  Goal: *Control of Pain  Outcome: Progressing Towards Goal     Problem: Falls - Risk of  Goal: *Absence of Falls  Description: Document Joe Fall Risk and appropriate interventions in the flowsheet.   Outcome: Progressing Towards Goal  Note: Fall Risk Interventions:                                Problem: Discharge Planning  Goal: *Discharge to safe environment  Outcome: Progressing Towards Goal  Goal: *Knowledge of medication management  Outcome: Progressing Towards Goal  Goal: *Knowledge of discharge instructions  Outcome: Progressing Towards Goal     Problem: Discharge Planning  Goal: *Discharge to safe environment  Outcome: Progressing Towards Goal

## 2021-01-28 NOTE — PROGRESS NOTES
conducted an initial consultation and Spiritual Assessment for Paul Amaro, who is a 37 y.o.,female. Patients Primary Language is: [place]. According to the patients EMR Roman Catholic Affiliation is: Non [employer]aritan.     The reason the Patient came to the hospital is:   Patient Active Problem List    Diagnosis Date Noted    Acute ITP ([facility] 01/27/2021    Thrombocytopenia ([facility] 10/13/2020    Iron deficiency anemia 10/13/2020    Excessive bleeding in premenopausal period 10/13/2020    Adnexal mass 10/13/2020    Flank pain 04/11/2019        The  provided the following Interventions:  Initiated a relationship of care and support. Explored issues of giancarlo, spirituality and/or Church needs while hospitalized. Listened empathically. Provided chaplaincy education. Provided information about Spiritual Care Services. Offered prayer and assurance of continued prayers on patient's behalf. Chart reviewed. The following outcomes were achieved:  Patient shared some information about their medical narrative and spiritual journey/beliefs. Patient processed feeling about current hospitalization. Patient expressed gratitude for the 's visit. Assessment:  Patient did not indicate any spiritual or Church issues which require Spiritual Care Services interventions at this time. Patient does not have any Church/cultural needs that will affect patients preferences in health care. Plan:  Chaplains will continue to follow and will provide pastoral care on an as needed or requested basis.  recommends bedside caregivers page  on duty if patient shows signs of acute spiritual or emotional distress.     [address] [facility]   Staff [address] [facility]   [phone]

## 2021-01-28 NOTE — PROGRESS NOTES
Telemetry monitor applied per orders on behalf of primary nurse Laura Paulson RN. [address] NSR. HR 90's.

## 2021-01-28 NOTE — PROGRESS NOTES
Bedside shift change report given to DEWAYNE Sanchez (oncoming nurse) by Susi Rain RN (offgoing nurse). Report included the following information SBAR, Kardex, Intake/Output, MAR and Recent Results.      0830 -- AM medications given, well tolerated, will continue to monitor. [id number] -- Patient's needs are met during rounds. 1 -- Spoke with Dr. Erik Velasquez, he aware of the critical platelet count 19, no further instructions. 1315 -- Tylenol given for back pain, patient up in the chair.     1415 -- Pain reassessed, IV flushed.

## 2021-01-28 NOTE — ROUTINE PROCESS
Platelets infused. Patient tolerated well. No adverse reactions noted. Requested that nurse supervisor start new PIV in left forearm. Left AC PIV infiltrated. Patient tolerated well. Solu-medrol infusion started. Tolerated well. No adverse reactions noted. Patient resting in bed. Vital signs stable throughout transfusions.  
 
Ramesh Fernandez, RN, BSN

## 2021-01-28 NOTE — ROUTINE PROCESS
Received critical platelets of 13.  Previous platelets were 6.  MD notified.  No new orders at this time. 
 
Rosa Jacob RN, BSN

## 2021-01-28 NOTE — ROUTINE PROCESS
Received call from lab that platelets are ready for transfusion. New 20 g PIV started in left antecubital.  Transfusion of immune globulin transferred to left AC and platelets transfused in right AC. Patient tolerated well. No noted adverse reactions in the first 15 minutes. Transfusion increased by 100 mL an hour every 15 minutes for an hour. Patient tolerated well. Denies any adverse reactions.  
 
Gianna Schaefer, RN, BSN

## 2021-01-28 NOTE — ROUTINE PROCESS
Bedside and Verbal shift change report given to DEWAYNE Sanchez, BSN (oncoming nurse) by Katherine Allen RN, BSN (offgoing nurse). Report included the following information SBAR, Kardex, ED Summary, Procedure Summary, Intake/Output, MAR, Recent Results and Cardiac Rhythm NSR. Katherine Allen RN, BSN

## 2021-01-28 NOTE — PROGRESS NOTES
Hospitalist Progress Note             Date of Service:  2021  NAME:  Fallon South  :  1985  MRN:  [id number]    Assessment   Principal Problem:    [facility] (2021)     Active Problems: Thrombocytopenia ([facility] (10/13/2020)              Plan      ITP/ thombocytopenia              - heme onc consult appreciated              - steriod taper per heme, IVIG x 4 doses, -              - platelet transfuse- up to 13 today              - monitor for bleeding              - tele      Code status: full  DVT prophylaxis: none      Hospital Problems  Date Reviewed: 10/21/2020          Codes Class Noted POA    * (Principal) Acute ITP ([facility] ICD-10-CM: D69.3  ICD-9-CM: 287.31  2021 Unknown        Thrombocytopenia ([facility] ICD-10-CM: D69.6  ICD-9-CM: 287.5  10/13/2020 Unknown                Review of Systems:   A comprehensive review of systems was negative except for that written in the HPI. Vital Signs:    Last 24hrs VS reviewed since prior progress note. Most recent are:  Visit Vitals  BP (!) 141/92 (BP 1 Location: Left arm, BP Patient Position: At rest)   Pulse 80   Temp 98.1 °F (36.7 °C)   Resp 18   Ht 5' 2\" (1.575 m)   Wt 66.2 kg (145 lb 15.1 oz)   SpO2 100%   BMI 26.69 kg/m²         Intake/Output Summary (Last 24 hours) at 2021 1102  Last data filed at 2021 0049  Gross per 24 hour   Intake 603.3 ml   Output    Net 603.3 ml        Physical Examination:             General:          Alert, cooperative, no distress, appears stated age. HEENT:           Atraumatic, anicteric sclerae, pink conjunctivae                          No oral ulcers, mucosa moist, throat clear, dentition fair  Neck:               Supple, symmetrical  Lungs:             Clear to auscultation bilaterally. No Wheezing or Rhonchi. No rales. Chest wall:      No tenderness  No Accessory muscle use.   Heart: Regular  rhythm,  No  murmur   No edema  Abdomen:        Soft, non-tender. Not distended. Bowel sounds normal  Extremities:     No cyanosis. No clubbing,                            Skin turgor normal, Capillary refill normal  Skin:                Not pale. Not Jaundiced  No rashes   Psych:             Not anxious or agitated. Neurologic:      Alert, moves all extremities, answers questions appropriately and responds to commands        Data Review:    Review and/or order of clinical lab test  Review and/or order of tests in the radiology section of CPT  Review and/or order of tests in the medicine section of CPT      Labs:     Recent Labs     01/28/21  0305 01/27/21  1628   WBC 3.2* 4.3*   HGB 11.0* 11.7*   HCT 34.3* 37.2   PLT 13* 5*     Recent Labs     01/27/21  1628      K 3.8      CO2 27   BUN 10   CREA 1.07   GLU 92   CA 8.7     No results for input(s): ALT, AP, TBIL, TBILI, TP, ALB, GLOB, GGT, AML, LPSE in the last 72 hours. No lab exists for component: SGOT, GPT, AMYP, HLPSE  Recent Labs     01/27/21  1628   INR 0.9   PTP 12.0   APTT 24.3      Recent Labs     01/27/21  1515   TIBC 268   PSAT 17*   FERR 674*      Lab Results   Component Value Date/Time    Folate 17.4 10/13/2020 03:49 PM      No results for input(s): PH, PCO2, PO2 in the last 72 hours. No results for input(s): CPK, CKNDX, TROIQ in the last 72 hours.     No lab exists for component: CPKMB  Lab Results   Component Value Date/Time    Cholesterol, total 131 08/27/2019 09:02 AM    HDL Cholesterol 59 08/27/2019 09:02 AM    LDL, calculated 61 08/27/2019 09:02 AM    Triglyceride 55 08/27/2019 09:02 AM    CHOL/HDL Ratio 2.2 08/27/2019 09:02 AM     No results found for: GLUCPOC  Lab Results   Component Value Date/Time    Color YELLOW 08/27/2019 09:00 AM    Appearance CLEAR 08/27/2019 09:00 AM    Specific gravity 1.021 08/27/2019 09:00 AM    pH (UA) 5.5 08/27/2019 09:00 AM    Protein NEGATIVE  08/27/2019 09:00 AM    Glucose NEGATIVE  08/27/2019 09:00 AM    Ketone NEGATIVE  08/27/2019 09:00 AM    Bilirubin NEGATIVE  08/27/2019 09:00 AM    Urobilinogen 0.2 08/27/2019 09:00 AM    Nitrites NEGATIVE  08/27/2019 09:00 AM    Leukocyte Esterase NEGATIVE  08/27/2019 09:00 AM    Epithelial cells 2+ 08/27/2019 09:00 AM    Bacteria 2+ (A) 08/27/2019 09:00 AM    WBC 0 to 3 08/27/2019 09:00 AM    RBC 4 to 10 08/27/2019 09:00 AM         Medications Reviewed:     Current Facility-Administered Medications   Medication Dose Route Frequency    0.9% sodium chloride infusion 250 mL  250 mL IntraVENous PRN    immune globulin 10% infusion 20 g  20 g IntraVENous DAILY    predniSONE (DELTASONE) tablet 60 mg  60 mg Oral DAILY WITH BREAKFAST    acetaminophen (TYLENOL) tablet 325 mg  325 mg Oral Q4H PRN    hydrOXYzine HCL (ATARAX) tablet 10 mg  10 mg Oral TID PRN    sodium chloride (NS) flush 5-40 mL  5-40 mL IntraVENous Q8H    sodium chloride (NS) flush 5-40 mL  5-40 mL IntraVENous PRN    polyethylene glycol (MIRALAX) packet 17 g  17 g Oral DAILY PRN    promethazine (PHENERGAN) tablet 12.5 mg  12.5 mg Oral Q6H PRN    Or    ondansetron (ZOFRAN) injection 4 mg  4 mg IntraVENous Q6H PRN    [START ON 1/31/2021] predniSONE (DELTASONE) tablet 50 mg  50 mg Oral DAILY WITH BREAKFAST    [START ON 2/3/2021] predniSONE (DELTASONE) tablet 40 mg  40 mg Oral DAILY WITH BREAKFAST    [START ON 2/6/2021] predniSONE (DELTASONE) tablet 30 mg  30 mg Oral DAILY WITH BREAKFAST    [START ON 2/9/2021] predniSONE (DELTASONE) tablet 20 mg  20 mg Oral DAILY WITH BREAKFAST    [START ON 2/12/2021] predniSONE (DELTASONE) tablet 10 mg  10 mg Oral DAILY WITH BREAKFAST     ______________________________________________________________________  EXPECTED LENGTH OF STAY: - - -  ACTUAL LENGTH OF STAY:          0                 Kalen Wooten MD

## 2021-01-29 LAB
BASOPHILS # BLD: 0 K/UL (ref 0–0.1)
BASOPHILS NFR BLD: 0 % (ref 0–2)
DIFFERENTIAL METHOD BLD: ABNORMAL
EOSINOPHIL # BLD: 0 K/UL (ref 0–0.4)
EOSINOPHIL NFR BLD: 0 % (ref 0–5)
ERYTHROCYTE [DISTWIDTH] IN BLOOD BY AUTOMATED COUNT: 13.4 % (ref 11.6–14.5)
ERYTHROCYTE [DISTWIDTH] IN BLOOD BY AUTOMATED COUNT: 13.6 % (ref 11.6–14.5)
HCT VFR BLD AUTO: 34.3 % (ref 35–45)
HCT VFR BLD AUTO: 35.5 % (ref 35–45)
HGB BLD-MCNC: 10.7 G/DL (ref 12–16)
HGB BLD-MCNC: 11.3 G/DL (ref 12–16)
LYMPHOCYTES # BLD: 1.3 K/UL (ref 0.9–3.6)
LYMPHOCYTES NFR BLD: 20 % (ref 21–52)
MCH RBC QN AUTO: 26.6 PG (ref 24–34)
MCH RBC QN AUTO: 27.2 PG (ref 24–34)
MCHC RBC AUTO-ENTMCNC: 31.2 G/DL (ref 31–37)
MCHC RBC AUTO-ENTMCNC: 31.8 G/DL (ref 31–37)
MCV RBC AUTO: 85.3 FL (ref 74–97)
MCV RBC AUTO: 85.5 FL (ref 74–97)
MONOCYTES # BLD: 0.6 K/UL (ref 0.05–1.2)
MONOCYTES NFR BLD: 9 % (ref 3–10)
NEUTS SEG # BLD: 4.4 K/UL (ref 1.8–8)
NEUTS SEG NFR BLD: 71 % (ref 40–73)
PLATELET # BLD AUTO: 30 K/UL (ref 135–420)
PLATELET # BLD AUTO: 39 K/UL (ref 135–420)
RBC # BLD AUTO: 4.02 M/UL (ref 4.2–5.3)
RBC # BLD AUTO: 4.15 M/UL (ref 4.2–5.3)
WBC # BLD AUTO: 6.2 K/UL (ref 4.6–13.2)
WBC # BLD AUTO: 6.8 K/UL (ref 4.6–13.2)

## 2021-01-29 PROCEDURE — 74011250637 HC RX REV CODE- 250/637: Performed by: HOSPITALIST

## 2021-01-29 PROCEDURE — 74011250636 HC RX REV CODE- 250/636: Performed by: INTERNAL MEDICINE

## 2021-01-29 PROCEDURE — 85025 COMPLETE CBC W/AUTO DIFF WBC: CPT

## 2021-01-29 PROCEDURE — 65660000000 HC RM CCU STEPDOWN

## 2021-01-29 PROCEDURE — 85027 COMPLETE CBC AUTOMATED: CPT

## 2021-01-29 PROCEDURE — 74011636637 HC RX REV CODE- 636/637: Performed by: HOSPITALIST

## 2021-01-29 PROCEDURE — 36415 COLL VENOUS BLD VENIPUNCTURE: CPT

## 2021-01-29 RX ADMIN — OXYCODONE AND ACETAMINOPHEN 1 TABLET: 5; 325 TABLET ORAL at 22:14

## 2021-01-29 RX ADMIN — Medication 9 MG: at 01:22

## 2021-01-29 RX ADMIN — Medication 10 ML: at 22:46

## 2021-01-29 RX ADMIN — PREDNISONE 60 MG: 10 TABLET ORAL at 09:05

## 2021-01-29 RX ADMIN — Medication 9 MG: at 22:15

## 2021-01-29 RX ADMIN — OXYCODONE AND ACETAMINOPHEN 1 TABLET: 5; 325 TABLET ORAL at 00:15

## 2021-01-29 RX ADMIN — Medication 10 ML: at 06:55

## 2021-01-29 RX ADMIN — IMMUNE GLOBULIN (HUMAN) 10 G: 10 INJECTION INTRAVENOUS; SUBCUTANEOUS at 22:30

## 2021-01-29 RX ADMIN — IMMUNE GLOBULIN (HUMAN) 10 G: 10 INJECTION INTRAVENOUS; SUBCUTANEOUS at 22:28

## 2021-01-29 NOTE — PROGRESS NOTES
1/29/2021  Chart reviewed. Patient with history of ITP, here for steroids, platelet infusions and IVIG infusions. Her plan is home with out patient follow up.

## 2021-01-29 NOTE — PROGRESS NOTES
Hospitalist Progress Note             Date of Service:  2021  NAME:  Audi Perales  :  1985  MRN:  [id number]    Assessment   Principal Problem:    [facility] (2021)     Active Problems:    Th[facility]openia [facility] (10/13/2020)             Plan      ITP/ thombocytopenia              - heme onc consult appreciated              - steriod taper per heme, IVIG x 4 doses, -              - platelet transfuse- up to 30 today              - monitor for bleeding              - tele        Code status: full  DVT prophylaxis: none           Hospital Problems  Date Reviewed: 10/21/2020          Codes Class Noted POA    * (Principal) Acute ITP ([facility] ICD-10-CM: D69.3  ICD-9-CM: 287.31  2021 Unknown        Thrombocytopenia ([facility] ICD-10-CM: D69.6  ICD-9-CM: 287.5  10/13/2020 Unknown                Review of Systems:   A comprehensive review of systems was negative except for that written in the HPI. No bruising, no hematoma, no ha, no vomiting. Vital Signs:    Last 24hrs VS reviewed since prior progress note. Most recent are:  Visit Vitals  BP (!) 144/87 (BP 1 Location: Right upper arm, BP Patient Position: Lying right side)   Pulse 70   Temp 98.7 °F (37.1 °C)   Resp 16   Ht 5' 2\" (1.575 m)   Wt 66.2 kg (145 lb 15.1 oz)   SpO2 98%   BMI 26.69 kg/m²         Intake/Output Summary (Last 24 hours) at 2021 1144  Last data filed at 2021 0440  Gross per 24 hour   Intake 568.04 ml   Output 875 ml   Net -306.96 ml        Physical Examination:             General:          Alert, cooperative, no distress, appears stated age. HEENT:           Atraumatic, anicteric sclerae, pink conjunctivae                          No oral ulcers, mucosa moist, throat clear, dentition fair  Neck:               Supple, symmetrical  Lungs:             Clear to auscultation bilaterally. No Wheezing or Rhonchi.  No rales.  Chest wall:      No tenderness  No Accessory muscle use. Heart:              Regular  rhythm,  No  murmur   No edema  Abdomen:        Soft, non-tender. Not distended. Bowel sounds normal  Extremities:     No cyanosis. No clubbing,                            Skin turgor normal, Capillary refill normal  Skin:                Not pale. Not Jaundiced  No rashes   Psych:             Not anxious or agitated. Neurologic:      Alert, moves all extremities, answers questions appropriately and responds to commands        Data Review:    Review and/or order of clinical lab test  Review and/or order of tests in the medicine section of [facility]      Labs:     Recent Labs     01/29/21  0405 01/28/21  1211   WBC 6.2 3.7*   HGB 10.7* 12.4   HCT 34.3* 38.5   PLT 30* 19*     Recent Labs     01/27/21  1628      K 3.8      CO2 27   BUN 10   CREA 1.07   GLU 92   CA 8.7     No results for input(s): ALT, AP, TBIL, TBILI, TP, ALB, GLOB, GGT, AML, LPSE in the last 72 hours. No lab exists for component: SGOT, GPT, AMYP, HLPSE  Recent Labs     01/27/21  1628   INR 0.9   PTP 12.0   APTT 24.3      Recent Labs     01/27/21  1515   TIBC 268   PSAT 17*   FERR 674*      Lab Results   Component Value Date/Time    Folate 17.4 10/13/2020 03:49 PM      No results for input(s): PH, PCO2, PO2 in the last 72 hours. No results for input(s): CPK, CKNDX, TROIQ in the last 72 hours.     No lab exists for component: CPKMB  Lab Results   Component Value Date/Time    Cholesterol, total 131 08/27/2019 09:02 AM    HDL Cholesterol 59 08/27/2019 09:02 AM    LDL, calculated 61 08/27/2019 09:02 AM    Triglyceride 55 08/27/2019 09:02 AM    CHOL/HDL Ratio 2.2 08/27/2019 09:02 AM     No results found for: GLUCPOC  Lab Results   Component Value Date/Time    Color YELLOW 08/27/2019 09:00 AM    Appearance CLEAR 08/27/2019 09:00 AM    Specific gravity 1.021 08/27/2019 09:00 AM    pH (UA) 5.5 08/27/2019 09:00 AM    Protein NEGATIVE  08/27/2019 09:00 AM Glucose NEGATIVE  08/27/2019 09:00 AM    Ketone NEGATIVE  08/27/2019 09:00 AM    Bilirubin NEGATIVE  08/27/2019 09:00 AM    Urobilinogen 0.2 08/27/2019 09:00 AM    Nitrites NEGATIVE  08/27/2019 09:00 AM    Leukocyte Esterase NEGATIVE  08/27/2019 09:00 AM    Epithelial cells 2+ 08/27/2019 09:00 AM    Bacteria 2+ (A) 08/27/2019 09:00 AM    WBC 0 to 3 08/27/2019 09:00 AM    RBC 4 to 10 08/27/2019 09:00 AM         Medications Reviewed:     Current Facility-Administered Medications   Medication Dose Route Frequency    naloxone (NARCAN) injection 0.4 mg  0.4 mg IntraVENous PRN    oxyCODONE-acetaminophen (PERCOCET) 5-325 mg per tablet 1 Tab  1 Tab Oral Q4H PRN    melatonin tablet 9 mg  9 mg Oral QHS    0.9% sodium chloride infusion 250 mL  250 mL IntraVENous PRN    immune globulin 10% infusion 20 g  20 g IntraVENous DAILY    predniSONE (DELTASONE) tablet 60 mg  60 mg Oral DAILY WITH BREAKFAST    acetaminophen (TYLENOL) tablet 325 mg  325 mg Oral Q4H PRN    sodium chloride (NS) flush 5-40 mL  5-40 mL IntraVENous Q8H    sodium chloride (NS) flush 5-40 mL  5-40 mL IntraVENous PRN    polyethylene glycol (MIRALAX) packet 17 g  17 g Oral DAILY PRN    promethazine (PHENERGAN) tablet 12.5 mg  12.5 mg Oral Q6H PRN    Or    ondansetron (ZOFRAN) injection 4 mg  4 mg IntraVENous Q6H PRN    [START ON 1/31/2021] predniSONE (DELTASONE) tablet 50 mg  50 mg Oral DAILY WITH BREAKFAST    [START ON 2/3/2021] predniSONE (DELTASONE) tablet 40 mg  40 mg Oral DAILY WITH BREAKFAST    [START ON 2/6/2021] predniSONE (DELTASONE) tablet 30 mg  30 mg Oral DAILY WITH BREAKFAST    [START ON 2/9/2021] predniSONE (DELTASONE) tablet 20 mg  20 mg Oral DAILY WITH BREAKFAST    [START ON 2/12/2021] predniSONE (DELTASONE) tablet 10 mg  10 mg Oral DAILY WITH BREAKFAST     ______________________________________________________________________  EXPECTED LENGTH OF STAY: - - -  ACTUAL LENGTH OF STAY:          1                 Shawn Vigil MD

## 2021-01-29 NOTE — PROGRESS NOTES
I reviewed labs and clinic al data today. Platelets are  Slowly increasing. Recommendations as follow:  1. ITP:   *Continue steroids taper as ordered  *Continue IVIG Q 24 hoursx4 doses  *Transfuse platelets only if =<78,652 or bleeding  *Daily CBC    Thank you for the consult

## 2021-01-29 NOTE — PROGRESS NOTES
Assume care of patient sitting up in chair. Alert and oriented X 4. Complain of sharp lower back pain and upper bilateral thigh pain with radiating from back to upper thigh. Bed locked in lowest position. Call light within reach and understand to use for assistance and needs. 2100 Notified Dr. Munir Good about patient severe back and thigh pain. 2108 Ultram 1 tab administered orally for complaint of lower back and bilateral thigh pain. 2205 Patient states medication not relieving pain with pain level maintaining at 9/10.     2300 Globulin infusing without problems. Remained in recliner for comfort from pain    01/29/2021    0015 Percocet 1 tab administered orally for complaint of lower back pain radiating to bilateral upper thigh.

## 2021-01-30 LAB
BASOPHILS # BLD: 0 K/UL (ref 0–0.1)
BASOPHILS NFR BLD: 0 % (ref 0–2)
DIFFERENTIAL METHOD BLD: ABNORMAL
EOSINOPHIL # BLD: 0 K/UL (ref 0–0.4)
EOSINOPHIL NFR BLD: 0 % (ref 0–5)
ERYTHROCYTE [DISTWIDTH] IN BLOOD BY AUTOMATED COUNT: 13.7 % (ref 11.6–14.5)
ERYTHROCYTE [DISTWIDTH] IN BLOOD BY AUTOMATED COUNT: 13.8 % (ref 11.6–14.5)
HCT VFR BLD AUTO: 33.3 % (ref 35–45)
HCT VFR BLD AUTO: 36.5 % (ref 35–45)
HGB BLD-MCNC: 10.5 G/DL (ref 12–16)
HGB BLD-MCNC: 11.4 G/DL (ref 12–16)
LYMPHOCYTES # BLD: 2.6 K/UL (ref 0.9–3.6)
LYMPHOCYTES NFR BLD: 32 % (ref 21–52)
MCH RBC QN AUTO: 27 PG (ref 24–34)
MCH RBC QN AUTO: 27.1 PG (ref 24–34)
MCHC RBC AUTO-ENTMCNC: 31.2 G/DL (ref 31–37)
MCHC RBC AUTO-ENTMCNC: 31.5 G/DL (ref 31–37)
MCV RBC AUTO: 85.6 FL (ref 74–97)
MCV RBC AUTO: 86.7 FL (ref 74–97)
MONOCYTES # BLD: 0.7 K/UL (ref 0.05–1.2)
MONOCYTES NFR BLD: 9 % (ref 3–10)
NEUTS SEG # BLD: 4.8 K/UL (ref 1.8–8)
NEUTS SEG NFR BLD: 59 % (ref 40–73)
PLATELET # BLD AUTO: 43 K/UL (ref 135–420)
PLATELET # BLD AUTO: 64 K/UL (ref 135–420)
RBC # BLD AUTO: 3.89 M/UL (ref 4.2–5.3)
RBC # BLD AUTO: 4.21 M/UL (ref 4.2–5.3)
WBC # BLD AUTO: 4.8 K/UL (ref 4.6–13.2)
WBC # BLD AUTO: 8.1 K/UL (ref 4.6–13.2)

## 2021-01-30 PROCEDURE — 85027 COMPLETE CBC AUTOMATED: CPT

## 2021-01-30 PROCEDURE — 74011250636 HC RX REV CODE- 250/636: Performed by: INTERNAL MEDICINE

## 2021-01-30 PROCEDURE — 36415 COLL VENOUS BLD VENIPUNCTURE: CPT

## 2021-01-30 PROCEDURE — 74011250637 HC RX REV CODE- 250/637: Performed by: HOSPITALIST

## 2021-01-30 PROCEDURE — 85025 COMPLETE CBC W/AUTO DIFF WBC: CPT

## 2021-01-30 PROCEDURE — 74011636637 HC RX REV CODE- 636/637: Performed by: HOSPITALIST

## 2021-01-30 PROCEDURE — 65660000000 HC RM CCU STEPDOWN

## 2021-01-30 RX ADMIN — Medication 9 MG: at 21:26

## 2021-01-30 RX ADMIN — IMMUNE GLOBULIN (HUMAN) 10 G: 10 INJECTION INTRAVENOUS; SUBCUTANEOUS at 21:13

## 2021-01-30 RX ADMIN — OXYCODONE AND ACETAMINOPHEN 1 TABLET: 5; 325 TABLET ORAL at 17:07

## 2021-01-30 RX ADMIN — Medication 10 ML: at 05:47

## 2021-01-30 RX ADMIN — Medication 10 ML: at 14:00

## 2021-01-30 RX ADMIN — Medication 10 ML: at 21:27

## 2021-01-30 RX ADMIN — IMMUNE GLOBULIN (HUMAN) 10 G: 10 INJECTION INTRAVENOUS; SUBCUTANEOUS at 21:12

## 2021-01-30 RX ADMIN — PREDNISONE 60 MG: 10 TABLET ORAL at 08:44

## 2021-01-30 NOTE — PROGRESS NOTES
Bedside and Verbal shift change report given to Antony Lynn  (oncoming nurse) by Mariana Ryan (offgoing nurse). Report included the following information SBAR, Kardex, Procedure Summary, Intake/Output, MAR, Recent Results, Cardiac Rhythm SR and Quality Measures. Awake alert and oriented. Ambulates to bathroom. Rec'd Prednisone this am . No complaints this shift . No s/s bleeding Labs improving   Bedside and Verbal shift change report given to Mariana Ryan  (oncoming nurse) by [facility]  (offgoing nurse). Report included the following information SBAR, Kardex, Procedure Summary, Intake/Output, MAR, Recent Results, Cardiac Rhythm SR and Quality Measures.

## 2021-01-30 NOTE — PROGRESS NOTES
Hospitalist Progress Note             Date of Service:  2021  NAME:  Michael Peterson  :  1985  MRN:  [id number]    Assessment   Principal Problem:    Acute ITP ([facility] [address] (2021)     Active Problems:    Thrombocytopenia ([address] (10/13/2020)              Plan      ITP/ thombocytopenia              - heme onc consult appreciated              - steriod taper per heme, IVIG x 4 doses, -              - platelet transfuse- up to 37 today              - monitor for bleeding              - tele        Code status: full  DVT prophylaxis: none    Plan on dc in am      Hospital Problems  Date Reviewed: 10/21/2020          Codes Class Noted POA    * (Principal) Acute ITP ([facility] [address] ICD-10-CM: D69.3  ICD-9-CM: 287.31  2021 Unknown        Thrombocytopenia ([facility] [address] ICD-10-CM: D69.6  ICD-9-CM: 287.5  10/13/2020 Unknown                Review of Systems:   A comprehensive review of systems was negative except for that written in the HPI. No bleeding, no new bruising, no ha, no vomiting      Vital Signs:    Last 24hrs VS reviewed since prior progress note. Most recent are:  Visit Vitals  BP (!) 132/91 (BP 1 Location: Left upper arm, BP Patient Position: Supine)   Pulse 64   Temp 98.3 °F (36.8 °C)   Resp 18   Ht 5' 2\" (1.575 m)   Wt 66.2 kg (145 lb 15.1 oz)   SpO2 99%   BMI 26.69 kg/m²         Intake/Output Summary (Last 24 hours) at 2021 1049  Last data filed at 2021 2215  Gross per 24 hour   Intake 600 ml   Output    Net 600 ml        Physical Examination:             General:          Alert, cooperative, no distress, appears stated age. HEENT:           Atraumatic, anicteric sclerae, pink conjunctivae                          No oral ulcers, mucosa moist, throat clear, dentition fair  Neck:               Supple, symmetrical  Lungs:             Clear to auscultation bilaterally. No Wheezing or Rhonchi.  No rales.  Chest wall:      No tenderness  No Accessory muscle use. Heart:              Regular  rhythm,  No  murmur   No edema  Abdomen:        Soft, non-tender. Not distended. Bowel sounds normal  Extremities:     No cyanosis. No clubbing,                            Skin turgor normal, Capillary refill normal  Skin:                Not pale. Not Jaundiced  No rashes   Psych:             Not anxious or agitated. Neurologic:      Alert, moves all extremities, answers questions appropriately and responds to commands        Data Review:    Review and/or order of clinical lab test  Review and/or order of tests in the radiology section of CPT  Review and/or order of tests in the medicine section of CPT      Labs:     Recent Labs     01/30/21  0315 01/29/21  1300   WBC 8.1 6.8   HGB 10.5* 11.3*   HCT 33.3* 35.5   PLT 43* 39*     Recent Labs     01/27/21  1628      K 3.8      CO2 27   BUN 10   CREA 1.07   GLU 92   CA 8.7     No results for input(s): ALT, AP, TBIL, TBILI, TP, ALB, GLOB, GGT, AML, LPSE in the last 72 hours. No lab exists for component: SGOT, GPT, AMYP, HLPSE  Recent Labs     01/27/21  1628   INR 0.9   PTP 12.0   APTT 24.3      Recent Labs     01/27/21  1515   TIBC 268   PSAT 17*   FERR 674*      Lab Results   Component Value Date/Time    Folate 17.4 10/13/2020 03:49 PM      No results for input(s): PH, PCO2, PO2 in the last 72 hours. No results for input(s): CPK, CKNDX, TROIQ in the last 72 hours.     No lab exists for component: CPKMB  Lab Results   Component Value Date/Time    Cholesterol, total 131 08/27/2019 09:02 AM    HDL Cholesterol 59 08/27/2019 09:02 AM    LDL, calculated 61 08/27/2019 09:02 AM    Triglyceride 55 08/27/2019 09:02 AM    CHOL/HDL Ratio 2.2 08/27/2019 09:02 AM     No results found for: GLUCPOC  Lab Results   Component Value Date/Time    Color YELLOW 08/27/2019 09:00 AM    Appearance CLEAR 08/27/2019 09:00 AM    Specific gravity 1.021 08/27/2019 09:00 AM    pH (UA) 5.5 08/27/2019 09:00 AM    Protein NEGATIVE  08/27/2019 09:00 AM    Glucose NEGATIVE  08/27/2019 09:00 AM    Ketone NEGATIVE  08/27/2019 09:00 AM    Bilirubin NEGATIVE  08/27/2019 09:00 AM    Urobilinogen 0.2 08/27/2019 09:00 AM    Nitrites NEGATIVE  08/27/2019 09:00 AM    Leukocyte Esterase NEGATIVE  08/27/2019 09:00 AM    Epithelial cells 2+ 08/27/2019 09:00 AM    Bacteria 2+ (A) 08/27/2019 09:00 AM    WBC 0 to 3 08/27/2019 09:00 AM    RBC 4 to 10 08/27/2019 09:00 AM         Medications Reviewed:     Current Facility-Administered Medications   Medication Dose Route Frequency    immune globulin 10% infusion 10 g  10 g IntraVENous DAILY    And    immune globulin 10% infusion 10 g  10 g IntraVENous DAILY    naloxone (NARCAN) injection 0.4 mg  0.4 mg IntraVENous PRN    oxyCODONE-acetaminophen (PERCOCET) 5-325 mg per tablet 1 Tab  1 Tab Oral Q4H PRN    melatonin tablet 9 mg  9 mg Oral QHS    0.9% sodium chloride infusion 250 mL  250 mL IntraVENous PRN    acetaminophen (TYLENOL) tablet 325 mg  325 mg Oral Q4H PRN    sodium chloride (NS) flush 5-40 mL  5-40 mL IntraVENous Q8H    sodium chloride (NS) flush 5-40 mL  5-40 mL IntraVENous PRN    polyethylene glycol (MIRALAX) packet 17 g  17 g Oral DAILY PRN    promethazine (PHENERGAN) tablet 12.5 mg  12.5 mg Oral Q6H PRN    Or    ondansetron (ZOFRAN) injection 4 mg  4 mg IntraVENous Q6H PRN    [START ON 1/31/2021] predniSONE (DELTASONE) tablet 50 mg  50 mg Oral DAILY WITH BREAKFAST    [START ON 2/3/2021] predniSONE (DELTASONE) tablet 40 mg  40 mg Oral DAILY WITH BREAKFAST    [START ON 2/6/2021] predniSONE (DELTASONE) tablet 30 mg  30 mg Oral DAILY WITH BREAKFAST    [START ON 2/9/2021] predniSONE (DELTASONE) tablet 20 mg  20 mg Oral DAILY WITH BREAKFAST    [START ON 2/12/2021] predniSONE (DELTASONE) tablet 10 mg  10 mg Oral DAILY WITH BREAKFAST     ______________________________________________________________________  EXPECTED LENGTH OF STAY: 3d 16h  ACTUAL LENGTH OF STAY:          2                 Tiana Parker MD

## 2021-01-30 NOTE — PROGRESS NOTES
Assume care of patient sitting in recliner. Alert and oriented X 4. Bed locked in lowest position. Call light within reach and understand to use for assistance and needs. 2214 Percocet 1 tab administered orally for complaint of lower back and upper bilateral thigh. 2230 Immune Globulin infusing at present without problems voiced. 2310 Patient remains watching TV but pain relief noted. 01/30/2021    0130 Resting quietly without problems voiced. 0330 Resting quietly without problems voiced. 0730 Bedside and Verbal shift change report given to Royer Mcwilliams RN (oncoming nurse) by Marylee Bees, RN (offgoing nurse). Report given with SBAR, Kardex, Intake/Output, MAR and Recent Results.

## 2021-01-31 VITALS
BODY MASS INDEX: 26.86 KG/M2 | WEIGHT: 145.94 LBS | SYSTOLIC BLOOD PRESSURE: 143 MMHG | HEART RATE: 64 BPM | DIASTOLIC BLOOD PRESSURE: 88 MMHG | RESPIRATION RATE: 16 BRPM | HEIGHT: 62 IN | TEMPERATURE: 98.4 F | OXYGEN SATURATION: 98 %

## 2021-01-31 LAB
ERYTHROCYTE [DISTWIDTH] IN BLOOD BY AUTOMATED COUNT: 13.5 % (ref 11.6–14.5)
HCT VFR BLD AUTO: 34.5 % (ref 35–45)
HGB BLD-MCNC: 10.8 G/DL (ref 12–16)
MCH RBC QN AUTO: 27.1 PG (ref 24–34)
MCHC RBC AUTO-ENTMCNC: 31.3 G/DL (ref 31–37)
MCV RBC AUTO: 86.5 FL (ref 74–97)
PLATELET # BLD AUTO: 77 K/UL (ref 135–420)
PMV BLD AUTO: 12.7 FL (ref 9.2–11.8)
RBC # BLD AUTO: 3.99 M/UL (ref 4.2–5.3)
WBC # BLD AUTO: 7.8 K/UL (ref 4.6–13.2)

## 2021-01-31 PROCEDURE — 36415 COLL VENOUS BLD VENIPUNCTURE: CPT

## 2021-01-31 PROCEDURE — 85027 COMPLETE CBC AUTOMATED: CPT

## 2021-01-31 PROCEDURE — 74011636637 HC RX REV CODE- 636/637: Performed by: INTERNAL MEDICINE

## 2021-01-31 RX ADMIN — PREDNISONE 50 MG: 50 TABLET ORAL at 08:12

## 2021-01-31 RX ADMIN — Medication 10 ML: at 05:21

## 2021-01-31 NOTE — PROGRESS NOTES
Problem: Pain  Goal: *Control of Pain  Outcome: Resolved/Met     Problem: Falls - Risk of  Goal: *Absence of Falls  Description: Document Joe Fall Risk and appropriate interventions in the flowsheet.   Outcome: Resolved/Met     Problem: Discharge Planning  Goal: *Discharge to safe environment  Outcome: Resolved/Met  Goal: *Knowledge of medication management  Outcome: Resolved/Met  Goal: *Knowledge of discharge instructions  Outcome: Resolved/Met     Problem: Discharge Planning  Goal: *Discharge to safe environment  Outcome: Resolved/Met

## 2021-01-31 NOTE — DISCHARGE INSTRUCTIONS
Patient Education        Immune Thrombocytopenic Purpura: Care Instructions  Your Care Instructions     Immune thrombocytopenic purpura, or ITP, is a blood problem. It happens when your immune system does not work as it should and destroys platelets in your blood. Platelets are a kind of cell in your blood. They have a sticky surface that helps them form clots to stop bleeding. Your blood can't clot if you don't have enough platelets. This causes abnormal bleeding. Bleeding can get worse over time, or it can come on fast.  To treat ITP, you may need to take medicines to help stop the destruction of platelets. You may need platelets added to your blood. Or you may need surgery to remove your spleen. Your spleen's job is to remove platelets from your body. So taking out the spleen helps increase your platelet count. Follow-up care is a key part of your treatment and safety. Be sure to make and go to all appointments, and call your doctor if you are having problems. It's also a good idea to know your test results and keep a list of the medicines you take. How can you care for yourself at home? · Be safe with medicines. Take your medicines exactly as prescribed. Call your doctor if you think you are having a problem with your medicine. · Before you start any new over-the-counter or prescribed medicine, tell your doctor if:  ? You have had a bad reaction to any medicines in the past.  ? You take other medicines, such as over-the-counter medicines, vitamins, or herbal supplements. ? You have other health problems. ? You are or could be pregnant. · Do not take aspirin or other anti-inflammatory medicines (such as ibuprofen or naproxen) without first talking to your doctor. Ask your doctor if it is okay to use acetaminophen (Tylenol). · Do not take two or more pain medicines at the same time unless the doctor told you to. Many pain medicines have acetaminophen, which is Tylenol.  Too much acetaminophen (Tylenol) can be harmful. · Get plenty of rest.  · \"Think safety\" and protect yourself from injury. Do not lift anything heavy. · Do not donate blood. · Do not drink alcohol or use illegal drugs. When should you call for help? Call 911 anytime you think you may need emergency care. For example, call if:    · You passed out (lost consciousness).     · You have signs of severe bleeding, which includes:  ? You have a severe headache that is different from past headaches. ? You vomit blood or what looks like coffee grounds. ? Your stools are maroon or very bloody. Call your doctor now or seek immediate medical care if:    · You are dizzy or lightheaded, or you feel like you may faint.     · You have abnormal bleeding, such as:  ? A nosebleed that you can't easily stop. This means it's still bleeding after pressure has been applied 3 times for 10 minutes each time (30 minutes total). ? Your stools are black and look like tar, or they have streaks of blood. ? You have blood in your urine. ? You have joint pain. ? You have bruises or blood spots under your skin. Watch closely for changes in your health, and be sure to contact your doctor if:    · You do not get better as expected. Where can you learn more? Go to http://www.gray.com/  Enter Q189 in the search box to learn more about \"Immune Thrombocytopenic Purpura: Care Instructions. \"  Current as of: November 8, 2019               Content Version: 12.6  © 7868-5985 [employer]. Care instructions adapted under license by [employer] (which disclaims liability or warranty for this information). If you have questions about a medical condition or this instruction, always ask your healthcare professional. [address] any warranty or liability for your use of this information. As part of the discharge instructions, medications already given today were discussed with the patient.  The next dose due of all ordered meds was highlighted as part of the medication discharge instructions. Discussed with the patient the importance of taking medications as directed, as well as the side effects and adverse reactions to medications ordered. DISCHARGE SUMMARY from Nurse    PATIENT INSTRUCTIONS:    After general anesthesia or intravenous sedation, for 24 hours or while taking prescription Narcotics:  · Limit your activities  · Do not drive and operate hazardous machinery  · Do not make important personal or business decisions  · Do  not drink alcoholic beverages  · If you have not urinated within 8 hours after discharge, please contact your surgeon on call. Report the following to your surgeon:  · Excessive pain, swelling, redness or odor of or around the surgical area  · Temperature over 100.5  · Nausea and vomiting lasting longer than 4 hours or if unable to take medications  · Any signs of decreased circulation or nerve impairment to extremity: change in color, persistent  numbness, tingling, coldness or increase pain  · Any questions    What to do at Home:  Recommended activity: Activity as tolerated,     If you experience any of the following symptoms chest pains, shortness of breath, dizziness, petechiea, nausea, vomiting, diarrhea, severe pains, please follow up with PCP or call 911. *  Please give a list of your current medications to your Primary Care Provider. *  Please update this list whenever your medications are discontinued, doses are      changed, or new medications (including over-the-counter products) are added. *  Please carry medication information at all times in case of emergency situations. These are general instructions for a healthy lifestyle:    No smoking/ No tobacco products/ Avoid exposure to second hand smoke  Surgeon General's Warning:  Quitting smoking now greatly reduces serious risk to your health.     Obesity, smoking, and sedentary lifestyle greatly increases your risk for illness    A healthy diet, regular physical exercise & weight monitoring are important for maintaining a healthy lifestyle    You may be retaining fluid if you have a history of heart failure or if you experience any of the following symptoms:  Weight gain of 3 pounds or more overnight or 5 pounds in a week, increased swelling in our hands or feet or shortness of breath while lying flat in bed.  Please call your doctor as soon as you notice any of these symptoms; do not wait until your next office visit.        The discharge information has been reviewed with the patient.  The patient verbalized understanding.  Discharge medications reviewed with the patient and appropriate educational materials and side effects teaching were provided.  ___________________________________________________________________________________________________________________________________  [employer] Activation    Thank you for requesting access to [employer]. Please follow the instructions below to securely access and download your online medical record. [employer] allows you to send messages to your doctor, view your test results, renew your prescriptions, schedule appointments, and more.    How Do I Sign Up?    1. In your internet browser, go to [email or web address]  2. Click on the First Time User? Click Here link in the Sign In box. You will be redirect to the New Member Sign Up page.  3. Enter your [employer] Access Code exactly as it appears below. You will not need to use this code after you’ve completed the sign-up process. If you do not sign up before the expiration date, you must request a new code.    [employer] Access Code: [email or web address]  Expires: 3/13/2021  3:03 PM (This is the date your [employer] access code will )    4. Enter the last four digits of your Social Security Number (xxxx) and Date of Birth (mm/dd/yyyy) as indicated and click Submit. You will be taken to the next sign-up page.  5. Create a MyChart ID.  This will be your [facility] login ID and cannot be changed, so think of one that is secure and easy to remember. 6. Create a [employer] password. You can change your password at any time. 7. Enter your Password Reset Question and Answer. This can be used at a later time if you forget your password. 8. Enter your e-mail address. You will receive e-mail notification when new information is available in [address] 9. Click Sign Up. You can now view and download portions of your medical record. 10. Click the Download Summary menu link to download a portable copy of your medical information. Additional Information    If you have questions, please visit the Frequently Asked Questions section of the [employer] website at [email or web address] Remember, [employer] is NOT to be used for urgent needs. For medical emergencies, dial 911.     Patient armband removed and shredded

## 2021-01-31 NOTE — PROGRESS NOTES
Patient discharge to home per MD. Patient giving discharge instruction, follow-up appointment and prescription sent to the patient's pharmacy of choice. Patient stated understanding of discharge instruction, follow-up within one with PCP for recent hospitalization and medication administration. Patient condition is stable. Patient transported to the exit via wheelchair accompanied by staff. No acute distress noted.

## 2021-01-31 NOTE — PROGRESS NOTES
Bedside shift change report given to Melany Solares RN (oncoming nurse) by Tan Lopez RN(offgoing nurse). Report included the following information SBAR, Kardex and Cardiac Rhythm NSR box 48.

## 2021-01-31 NOTE — DISCHARGE SUMMARY
Discharge Summary       PATIENT ID: Ivania Natarajan  MRN: [id number]   YOB: 1985    DATE OF ADMISSION: 1/27/2021  4:11 PM    DATE OF DISCHARGE: 01/31/21    PRIMARY CARE PROVIDER: None     ATTENDING PHYSICIAN: Ingris Escobar MD  DISCHARGING PROVIDER: Ingris Escobar MD        CONSULTATIONS: IP CONSULT TO HOSPITALIST    PROCEDURES/SURGERIES: * No surgery found *    ADMITTING [address] [place] [address] COURSE:   Ivania Natarajan is a 37y.o. year old female who presents with  Thrombocytopenia. Patient presents from oncologist office with findings of platelet count of 1125. She has hx of ITP. Heme onc physician contacted and recommended IVIG steriod and admission. Orders placed by heme onc for medications. Patient will be admitted for observation and ongoing medical treatment. Patient had multiple occurrences last yr but this is the lowest platelet count she has had           DISCHARGE DIAGNOSES / PLAN:      Assessment   Principal Problem:    Acute ITP ([facility] [address] (1/27/2021)     Active Problems:    Thrombocytopenia ([facility] [address] (10/13/2020)              Plan      ITP/ thombocytopenia              - heme onc consult appreciated              - steriod taper per heme, IVIG x 4 doses, 1/27-1/30              - platelet transfuse- up to 77 today              - monitor for bleeding              - tele        Code status: full  DVT prophylaxis: none     Plan on dc in am    Platlets up to 77k, no bleeding, no bruising. Will take steriod taper and fu with heme as instructed by same. Total dc time 35 mins. FOLLOW UP APPOINTMENTS:    Follow-up Information     Follow up With Specialties Details Why Contact Info    None    None ([phone]) Patient stated that they have no PCP               DIET: regular      DISCHARGE MEDICATIONS:  Current Discharge Medication List      CONTINUE these medications which have NOT CHANGED    Details   ! ! predniSONE (DELTASONE) 20 mg tablet Slow taper:  Take 60mg x 3 days then 50mg x 3days then 40mg x 3 days then 30mg x 3 days the 20mg x 3 days then 10mg x 3 days then discontinue. Use with 10mg tabs  Qty: 27 Tab, Refills: 0      !! predniSONE (DELTASONE) 10 mg tablet Take 10 mg by mouth daily (with breakfast). Slow taper: Take 60mg x 3 days then 50mg x 3days then 40mg x 3 days then 30mg x 3 days the 20mg x 3 days then 10mg x 3 days then discontinue. Use with 20mg tabs  Qty: 9 Tab, Refills: 0      oxyCODONE-acetaminophen (PERCOCET) 5-325 mg per tablet TAKE 1 TABLET BY MOUTH EVERY 4 HOURS AS NEEDED (DO NOT EXCEED 4000MG OF ACETAMINOPHEN PER DAY.)      VITAMIN D2 50,000 unit capsule take 1 capsule by mouth every week for 4 weeks  Refills: 0      hydrOXYzine HCl (ATARAX) 10 mg tablet Take 10 mg by mouth two (2) times daily as needed. Refills: 0      acetaminophen (TYLENOL) 325 mg tablet Take  by mouth every four (4) hours as needed for Pain. !! - Potential duplicate medications found. Please discuss with provider. NOTIFY YOUR PHYSICIAN FOR ANY OF THE FOLLOWING:   Fever over 101 degrees for 24 hours. Chest pain, shortness of breath, fever, chills, nausea, vomiting, diarrhea, change in mentation, falling, weakness, bleeding. Severe pain or pain not relieved by medications. Or, any other signs or symptoms that you may have questions about. DISPOSITION:    Home With:   OT  PT  HH  RN       Long term SNF/Inpatient Rehab    Independent/assisted living    Hospice    Other:   PHYSICAL EXAMINATION AT DISCHARGE:  General:          Alert, cooperative, no distress, appears stated age. HEENT:           Atraumatic, anicteric sclerae, pink conjunctivae                          No oral ulcers, mucosa moist, throat clear, dentition fair  Neck:               Supple, symmetrical  Lungs:             Clear to auscultation bilaterally. No Wheezing or Rhonchi. No rales. Chest wall:      No tenderness  No Accessory muscle use.   Heart:              Regular  rhythm,  No  murmur   No edema  Abdomen:        Soft, non-tender. Not distended. Bowel sounds normal  Extremities:     No cyanosis. No clubbing,                            Skin turgor normal, Capillary refill normal  Skin:                Not pale. Not Jaundiced  No rashes   Psych:             Not anxious or agitated.   Neurologic:      Alert, moves all extremities, answers questions appropriately and responds to commands       CHRONIC MEDICAL DIAGNOSES:  Problem List as of 1/31/2021 Date Reviewed: 10/21/2020          Codes Class Noted - Resolved    * (Principal) Acute ITP ([facility] ICD-10-CM: D69.3  ICD-9-CM: 287.31  1/27/2021 - Present        Thrombocytopenia ([facility] ICD-10-CM: D69.6  ICD-9-CM: 287.5  10/13/2020 - Present        Iron deficiency anemia ICD-10-CM: D50.9  ICD-9-CM: 280.9  10/13/2020 - Present        Excessive bleeding in premenopausal period ICD-10-CM: N92.4  ICD-9-CM: 627.0  10/13/2020 - Present        Adnexal mass ICD-10-CM: N94.89  ICD-9-CM: 625.8  10/13/2020 - Present        Flank pain ICD-10-CM: R10.9  ICD-9-CM: 789.09  4/11/2019 - Present              Greater than 35 minutes were spent with the patient on counseling and coordination of care    Signed:   Evangelina Smalls MD  1/31/2021  11:32 AM

## 2021-02-01 ENCOUNTER — PATIENT OUTREACH (OUTPATIENT)
Dept: OTHER | Age: 36
End: 2021-02-01

## 2021-02-01 NOTE — PROGRESS NOTES
TASIA      Patient with IP stay at [facility] 1/27-1/31/ 2021 for Severe Thrombocytopenia (Plt 5000)    * Transfx and IVIG given. 12:00 noon   Initial attempt to contact patient for transitions of care. Left discreet message on voicemail with this CM contact information. Will attempt to contact again. Chart Review:    ADMITTING [facility] COURSE:   Tova Gaytan a 37y.o. year old female who presents with Dana Sandoval presents from oncologist office with findings of platelet count of 3850. She has hx of ITP.  Heme onc physician contacted and recommended IVIG steriod and admission.  Orders placed by heme onc for medications.  Patient will be admitted for observation and ongoing medical treatment. Chico Reyes had multiple occurrences last yr but this is the lowest platelet count she has had. Diagnosis management comments: 63-year-old female with history of ITP sent here from her oncologist office for blood work drawn today that showed platelets of 3. Case was discussed on the phone with her oncologist, Dr. Zac Sanford, who recommended initiation of IV steroids, platelet infusion and admission for IVIG. Full laboratory work-up ordered. Will reassess.     Laboratory work-up significant for thrombocytopenia to 5. She is also chronically anemic which is not significantly changed from baseline. Coags normal.  Pregnancy test negative. 30mg/kg bolus of IV solumedrol and platelet transfusion ordered.  Case discussed with the hospitalist, Dr. Radha Bhatia, who accepted admission for ITP requiring platelet transfusion and initiation of IVIG therapy.       Plan      ITP/ thombocytopenia              - heme onc consult appreciated              - steriod taper per heme, IVIG x 4 doses, 1/27-1/30              - platelet transfuse- up to 77 today              - monitor for bleeding              - tele     IMPRESSION      1. Severe right hydronephrosis and hydroureter to the level of the bladder, without clear etiology. Cortical thinning of the right kidney suggests this may be chronic. Recommend urologic evaluation and nonemergent CT urogram.     A notification that a wet reading is available was posted to the ED trackboard at 04:13 AM on 4/11/2019.     Dictated By: Maria Guadalupe Bashir on 4/11/2019 4:15 AM     1/30/2021  3:48 AM - Mic, Lab In [facility]    Component Value Flag Ref Range Units Status   WBC 8.1   4.6 - 13.2 K/uL Final   RBC 3.89  Low   4.20 - 5.30 M/uL Final   HGB 10.5  Low   12.0 - 16.0 g/dL Final   HCT 33.3  Low   35.0 - 45.0 % Final   MCV 85.6   74.0 - 97.0 FL Final   MCH 27.0   24.0 - 34.0 PG Final   MCHC 31.5   31.0 - 37.0 g/dL Final   RDW 13.7   11.6 - 14.5 % Final   PLATELET 43  Low   931 - 420 K/uL Final     DISCHARGE MEDICATIONS:      Current Discharge Medication List           CONTINUE these medications which have NOT CHANGED     Details   ! ! predniSONE (DELTASONE) 20 mg tablet Slow taper: Take 60mg x 3 days then 50mg x 3days then 40mg x 3 days then 30mg x 3 days the 20mg x 3 days then 10mg x 3 days then discontinue. Use with 10mg tabs  Qty: 27 Tab, Refills: 0       !! predniSONE (DELTASONE) 10 mg tablet Take 10 mg by mouth daily (with breakfast). Slow taper: Take 60mg x 3 days then 50mg x 3days then 40mg x 3 days then 30mg x 3 days the 20mg x 3 days then 10mg x 3 days then discontinue. Use with 20mg tabs  Qty: 9 Tab, Refills: 0       oxyCODONE-acetaminophen (PERCOCET) 5-325 mg per tablet TAKE 1 TABLET BY MOUTH EVERY 4 HOURS AS NEEDED (DO NOT EXCEED 4000MG OF ACETAMINOPHEN PER DAY. )       VITAMIN D2 50,000 unit capsule take 1 capsule by mouth every week for 4 weeks  Refills: 0       hydrOXYzine HCl (ATARAX) 10 mg tablet Take 10 mg by mouth two (2) times daily as needed.   Refills: 0       acetaminophen (TYLENOL) 325 mg tablet Take  by mouth every four (4) hours as needed for Pain.

## 2021-02-02 ENCOUNTER — PATIENT OUTREACH (OUTPATIENT)
Dept: OTHER | Age: 36
End: 2021-02-02

## 2021-02-02 NOTE — LETTER
2/2/2021 5:08 PM 
 
Ms. Margarita Maxwell [phone] Dear Ms. Ashwini Monson, My name is Dee Weems , Associate Care Manager for [address] [place] [address], and I hope you remember me from working together in the past.  I have been trying to reach you. The Associate Care  is a free-of-charge, confidential service provided to our associates and their family members covered by the [facility]. Part of my job is to follow up with members who have recently been in the hospital or emergency room, to help them coordinate their care and answer questions they may have about their visit. I am able to provide assistance with medication questions, scheduling needed follow-up appointments, and arranging services like home health or home medical equipment. I can also provide education regarding your hospital or ER visit as well as your medical conditions. As healthcare providers, we know that patients do better when they have close follow up with a primary care provider (PCP), especially after a hospital or emergency department visit. If you do not have a PCP, I can help you find one that is convenient to you and covered by your insurance. I can also help you understand any after visit instructions, such as what symptoms to watch out for, or any new or changed medications. Remember that you can access your After Visit Summary by logging into your You[employer] account. If you do not have a YouMail account, I can help you request access. Our program is designed to provide you with the opportunity to have a [address] [place] [address] FOR CHILDREN partner with you for your healthcare needs. Please contact me at the below number if I can provide you with assistance for any of the above services. Sincerely, 
 
 
Dee Weems, RN 
Dee Weems, RN, MS, [address]  Associate Care Manager Wanda       Phone:  [phone]     Fax:  [phone]    [email or web address]

## 2021-02-02 NOTE — PROGRESS NOTES
2nd [facility] outreach       Patient with IP stay at [facility] 1/27-1/31/ 2021 for Severe Thrombocytopenia (Plt 5000)    * Transfx and IVIG given. Patient identified as eligible for [address] [place] [address] services. Second telephone outreach attempted. Left discreet voicemail with this CM confidential contact information. Will send UTR letter/ via My Chart.     Follow for potential patient engagement

## 2021-02-03 ENCOUNTER — TELEPHONE (OUTPATIENT)
Age: 36
End: 2021-02-03

## 2021-02-03 ENCOUNTER — DOCUMENTATION ONLY (OUTPATIENT)
Age: 36
End: 2021-02-03

## 2021-02-03 DIAGNOSIS — D69.6 THROMBOCYTOPENIA (HCC): Primary | ICD-10-CM

## 2021-02-03 NOTE — TELEPHONE ENCOUNTER
I called patient cell phone number and left a message for her to call us back to check CBC as soon as possible to follow-up her platelets. I also tried all the numbers in her chart. I got to somebody with [phone] but they hung up on me. I tried the #[phone] and was told by a lady that that was the wrong number. Hopefully patient will get back to us soon.

## 2021-02-04 ENCOUNTER — HOSPITAL ENCOUNTER (OUTPATIENT)
Dept: LAB | Age: 36
Discharge: HOME | End: 2021-02-04
Payer: COMMERCIAL

## 2021-02-04 DIAGNOSIS — D69.6 THROMBOCYTOPENIA (HCC): ICD-10-CM

## 2021-02-04 LAB
BASOPHILS # BLD: 0 K/UL (ref 0–0.06)
BASOPHILS NFR BLD: 0 % (ref 0–3)
DIFFERENTIAL METHOD BLD: ABNORMAL
EOSINOPHIL # BLD: 0 K/UL (ref 0–0.4)
EOSINOPHIL NFR BLD: 0 % (ref 0–5)
ERYTHROCYTE [DISTWIDTH] IN BLOOD BY AUTOMATED COUNT: 14.4 % (ref 11.6–14.5)
HCT VFR BLD AUTO: 36.1 % (ref 35–45)
HGB BLD-MCNC: 11.7 G/DL (ref 12–16)
LYMPHOCYTES # BLD: 3.7 K/UL (ref 0.8–3.5)
LYMPHOCYTES NFR BLD: 31 % (ref 20–51)
MCH RBC QN AUTO: 27.7 PG (ref 24–34)
MCHC RBC AUTO-ENTMCNC: 32.4 G/DL (ref 31–37)
MCV RBC AUTO: 85.3 FL (ref 74–97)
MONOCYTES # BLD: 1.2 K/UL (ref 0–1)
MONOCYTES NFR BLD: 10 % (ref 2–9)
NEUTS SEG # BLD: 6.9 K/UL (ref 1.8–8)
NEUTS SEG NFR BLD: 59 % (ref 42–75)
PLATELET # BLD AUTO: 292 K/UL (ref 135–420)
PLATELET COMMENTS,PCOM: ABNORMAL
PMV BLD AUTO: 10.1 FL (ref 9.2–11.8)
RBC # BLD AUTO: 4.23 M/UL (ref 4.2–5.3)
RBC MORPH BLD: ABNORMAL
WBC # BLD AUTO: 11.8 K/UL (ref 4.6–13.2)

## 2021-02-04 PROCEDURE — 85025 COMPLETE CBC W/AUTO DIFF WBC: CPT

## 2021-02-05 ENCOUNTER — DOCUMENTATION ONLY (OUTPATIENT)
Age: 36
End: 2021-02-05

## 2021-02-05 NOTE — PROGRESS NOTES
I attempted to call the three numbers listed in CC at [phone], I left a VM on her mobile number. For the second number somebody answered but said that is not the patient's phone number, and the 3rd number answered and hanged up the phone on me.

## 2021-02-16 ENCOUNTER — PATIENT OUTREACH (OUTPATIENT)
Dept: OTHER | Age: 36
End: 2021-02-16

## 2021-02-16 NOTE — PROGRESS NOTES
TASIA outreach      Patient with IP stay at [facility] 1/27-1/31/ 2021 for Severe Thrombocytopenia (Plt 5000)    * Transfx and IVIG given.      1:15pm  Patient identified as eligible for [address] [place] [address] services. Third telephone outreach attempted. Left discreet voicemail with this CM confidential contact information. 1:20pm  CM calls [facility]- asks for Patient  office/    * Contacted Ms. Benito Jasso.     - She reports that she broke her cell phone and has not been able to see calls. * CM encourages patient to Call Dr. Valdo Paez office/   That blood work is needed ASAP. She will call the office. * Unable to complete TASIA at work/   Pt will call me.

## 2021-02-19 ENCOUNTER — HOSPITAL ENCOUNTER (OUTPATIENT)
Dept: INFUSION THERAPY | Age: 36
Discharge: HOME | End: 2021-02-19
Payer: COMMERCIAL

## 2021-02-19 VITALS
HEART RATE: 76 BPM | SYSTOLIC BLOOD PRESSURE: 141 MMHG | DIASTOLIC BLOOD PRESSURE: 92 MMHG | TEMPERATURE: 99 F | OXYGEN SATURATION: 99 %

## 2021-02-19 LAB
BASO+EOS+MONOS # BLD AUTO: 0.8 K/UL (ref 0–2.3)
BASO+EOS+MONOS NFR BLD AUTO: 11 % (ref 0.1–17)
DIFFERENTIAL METHOD BLD: NORMAL
ERYTHROCYTE [DISTWIDTH] IN BLOOD BY AUTOMATED COUNT: 14.4 % (ref 11.5–14.5)
HCT VFR BLD AUTO: 39.4 % (ref 36–48)
HGB BLD-MCNC: 12.6 G/DL (ref 12–16)
LYMPHOCYTES # BLD: 2.7 K/UL (ref 1.1–5.9)
LYMPHOCYTES NFR BLD: 39 % (ref 14–44)
MCH RBC QN AUTO: 27.8 PG (ref 25–35)
MCHC RBC AUTO-ENTMCNC: 32 G/DL (ref 31–37)
MCV RBC AUTO: 86.8 FL (ref 78–102)
NEUTS SEG # BLD: 3.5 K/UL (ref 1.8–9.5)
NEUTS SEG NFR BLD: 50 % (ref 40–70)
PLATELET # BLD AUTO: 238 K/UL (ref 140–440)
RBC # BLD AUTO: 4.54 M/UL (ref 4.1–5.1)
WBC # BLD AUTO: 7 K/UL (ref 4.5–13)

## 2021-02-19 PROCEDURE — 85025 COMPLETE CBC W/AUTO DIFF WBC: CPT

## 2021-02-19 PROCEDURE — 36415 COLL VENOUS BLD VENIPUNCTURE: CPT

## 2021-02-19 NOTE — PROGRESS NOTES
[facility] Progress Note    Date: 2021    Name: José Rosario    MRN: [id number]         : 1985    Peripheral Lab Draw      Ms. Lilly to [place], ambulatory at 1310 accompanied by self. Pt was assessed and education was provided. Ms. Vj Tan vitals were reviewed and patient was observed for 5 minutes prior to treatment. Visit Vitals  BP (!) 141/92 (BP 1 Location: Right upper arm, BP Patient Position: Sitting)   Pulse 76   Temp 99 °F (37.2 °C)   SpO2 99%     Recent Results (from the past 12 hour(s))   CBC WITH 3 PART DIFF    Collection Time: 21  1:20 PM   Result Value Ref Range    WBC 7.0 4.5 - 13.0 K/uL    RBC 4.54 4.10 - 5.10 M/uL    HGB 12.6 12.0 - 16.0 g/dL    HCT 39.4 36 - 48 %    MCV 86.8 78 - 102 FL    MCH 27.8 25.0 - 35.0 PG    MCHC 32.0 31 - 37 g/dL    RDW 14.4 11.5 - 14.5 %    PLATELET 944 049 - 612 K/uL    NEUTROPHILS 50 40 - 70 %    MIXED CELLS 11 0.1 - 17 %    LYMPHOCYTES 39 14 - 44 %    ABS. NEUTROPHILS 3.5 1.8 - 9.5 K/UL    ABS. MIXED CELLS 0.8 0.0 - 2.3 K/uL    ABS. LYMPHOCYTES 2.7 1.1 - 5.9 K/UL    DF AUTOMATED         Blood obtained peripherally from left arm x 1 attempt with butterfly needle and sent to lab for Cbc w/diff per written orders. No bleeding or hematoma noted at site. Gauze and coban applied. Ms. Luciano Muse tolerated the phlebotomy, and had no complaints. Patient armband removed and shredded. Ms. Luciano Muse was discharged from [place] [address] in stable condition at 1320.      Brandon Mitchell Phlebotomist PCT  2021  2:16 PM

## 2021-02-23 ENCOUNTER — APPOINTMENT (OUTPATIENT)
Dept: INFUSION THERAPY | Age: 36
End: 2021-02-23

## 2021-03-04 ENCOUNTER — PATIENT OUTREACH (OUTPATIENT)
Dept: OTHER | Age: 36
End: 2021-03-04

## 2021-03-04 NOTE — PROGRESS NOTES
TASIA  Wrap Up    Resolving current episode (Transitions of care complete). No further ED/UC or hospital admissions within 30 days post discharge. Patient attended follow-up appointments as directed. Final attempt to contact patient for transitions of care. Mailbox Full answer- unable to LVM. No outreach from patient to [address] [place] [address].

## 2021-04-27 ENCOUNTER — TELEPHONE (OUTPATIENT)
Age: 36
End: 2021-04-27

## 2021-04-27 DIAGNOSIS — D50.9 IRON DEFICIENCY ANEMIA, UNSPECIFIED IRON DEFICIENCY ANEMIA TYPE: ICD-10-CM

## 2021-04-27 DIAGNOSIS — D69.6 THROMBOCYTOPENIA (HCC): Primary | ICD-10-CM

## 2021-04-27 NOTE — TELEPHONE ENCOUNTER
Patient would like to know if there are any other labs except the CBC that she needs to get before coming in for an appointment

## 2021-04-27 NOTE — TELEPHONE ENCOUNTER
Attempted to call patient back to schedule appointment.  Tried to call patient at [phone] but was unable to leave a message so I left a message on [phone]

## 2021-05-28 ENCOUNTER — TELEPHONE (OUTPATIENT)
Age: 36
End: 2021-05-28

## 2021-06-16 ENCOUNTER — TELEPHONE (OUTPATIENT)
Age: 36
End: 2021-06-16

## 2021-07-22 ENCOUNTER — TELEPHONE (OUTPATIENT)
Age: 36
End: 2021-07-22

## 2021-07-22 NOTE — TELEPHONE ENCOUNTER
Received a call from [facility] with [facility] with a critical Lab. Platelet level is 6. Alan Rowland NP aware.

## 2021-07-22 NOTE — TELEPHONE ENCOUNTER
Attempted to contact pt because her platelets are very low (platelets 6), and she will need to go to the ED ASAP for transfusion. Was unable to speak to pt over the phone on her mobile number and left her a detailed voicemail with [facility] phone number. Also tried contacting her mother and [phone] and was unable to speak with her on the phone. Left a detailed voicemail with [facility] phone number. Dialed her alternate  listed number [phone] however, that number is out of service and I was unable to leave a vm.

## 2021-08-12 ENCOUNTER — OFFICE VISIT (OUTPATIENT)
Age: 36
End: 2021-08-12
Payer: COMMERCIAL

## 2021-08-12 VITALS
OXYGEN SATURATION: 97 % | BODY MASS INDEX: 26.98 KG/M2 | DIASTOLIC BLOOD PRESSURE: 100 MMHG | SYSTOLIC BLOOD PRESSURE: 140 MMHG | TEMPERATURE: 98 F | HEART RATE: 73 BPM | RESPIRATION RATE: 12 BRPM | HEIGHT: 62 IN | WEIGHT: 146.6 LBS

## 2021-08-12 DIAGNOSIS — D69.3 CHRONIC ITP (IDIOPATHIC THROMBOCYTOPENIA) (HCC): ICD-10-CM

## 2021-08-12 DIAGNOSIS — D50.9 IRON DEFICIENCY ANEMIA, UNSPECIFIED IRON DEFICIENCY ANEMIA TYPE: ICD-10-CM

## 2021-08-12 DIAGNOSIS — D69.3 CHRONIC ITP (IDIOPATHIC THROMBOCYTOPENIA) (HCC): Primary | ICD-10-CM

## 2021-08-12 DIAGNOSIS — N92.4 EXCESSIVE BLEEDING IN PREMENOPAUSAL PERIOD: ICD-10-CM

## 2021-08-12 PROCEDURE — 99214 OFFICE O/P EST MOD 30 MIN: CPT | Performed by: INTERNAL MEDICINE

## 2021-08-12 RX ORDER — ETONOGESTREL AND ETHINYL ESTRADIOL .12; .015 MG/D; MG/D
RING VAGINAL
COMMUNITY
Start: 2021-07-25 | End: 2021-11-29

## 2021-08-12 NOTE — PROGRESS NOTES
Jamal Chun is a 37 y.o.  female who I was asked to see in consultation at the request of Dr. Missy Greenwood for evaluation for severe thrombocytopenia from ITP.     Mrs. Cathy Aldridge is well-known to me. She has history of ITP. She presented yesterday in clinic with complaint of large thigh ecchymosis and epistaxis. I checked CBC and platelet came back at 5000. Patient was then sent to ER to be admitted for platelets, prednisone and IVIG. I saw the patient in consultation in January 2021, and she is finally here for follow-up. She was also in the hospital on 7/29/2021 with heavy bruising and platelet with 6604. She received platelet transfusion.     Today on review of systems she has  no new ecchymosis. Has mild petechiae. No overt petechiae. Has occasional nose bleed. No melena or bright red blood per rectum. All other point of review of system have been reviewed and were negative.       Past Medical History:   Diagnosis Date    Hydronephrosis     Idiopathic thrombocytopenic purpura (ITP) (HCC)     Leukopenia     Ovarian cyst     Thrombocytopenia (HCC)      Past Surgical History:   Procedure Laterality Date    HX COLONOSCOPY      HX NEPHRECTOMY  06/27/2019    Laparoscopic right nephrectomy    HX OTHER SURGICAL       CYSTOSCOPY, WITH RIGHT RETROGRADE PYELOGRAM AND RIGHT URETERAL STENT REPLACEMENT     Social History     Socioeconomic History    Marital status: SINGLE     Spouse name: Not on file    Number of children: Not on file    Years of education: Not on file    Highest education level: Not on file   Tobacco Use    Smoking status: Never Smoker    Smokeless tobacco: Never Used   Substance and Sexual Activity    Alcohol use:  Yes    Drug use: Never    Sexual activity: Yes     Social Determinants of Health     Financial Resource Strain:     Difficulty of Paying Living Expenses:    Food Insecurity:     Worried About Running Out of Food in the Last Year:     [address] in the Last Year:    Transportation Needs:     Lack of Transportation (Medical):  Lack of Transportation (Non-Medical):    Physical Activity:     Days of Exercise per Week:     Minutes of Exercise per Session:    Stress:     Feeling of Stress :    Social Connections:     Frequency of Communication with Friends and Family:     Frequency of Social Gatherings with Friends and Family:     Attends [employer]ae[employer] Services:     Active Member of Clubs or Organizations:     Attends Club or Organization Meetings:     Marital Status:      Family History   Problem Relation Age of Onset    Hypertension Mother        Current Outpatient Medications   Medication Sig Dispense Refill    EluRyng 0.12-0.015 mg/24 hr vaginal ring INSERT ONE RING VAGINALLY AND LEAVE IN PLACE FOR 3 CONSECUTIVE WEEKS THEN REMOVE FOR 1 WEEK. INSERT NEW RING 7 DAYS AFTER THE LAST WAS REMOVED      eltrombopag (PROMACTA) 50 mg tablet Take 1 Tablet by mouth Daily (before breakfast) for 30 days. 30 Tablet 5    VITAMIN D2 50,000 unit capsule take 1 capsule by mouth every week for 4 weeks  0    hydrOXYzine HCl (ATARAX) 10 mg tablet Take 10 mg by mouth two (2) times daily as needed. 0    acetaminophen (TYLENOL) 325 mg tablet Take  by mouth every four (4) hours as needed for Pain.  predniSONE (DELTASONE) 20 mg tablet Slow taper: Take 60mg x 3 days then 50mg x 3days then 40mg x 3 days then 30mg x 3 days the 20mg x 3 days then 10mg x 3 days then discontinue. Use with 10mg tabs 27 Tab 0    predniSONE (DELTASONE) 10 mg tablet Take 10 mg by mouth daily (with breakfast). Slow taper: Take 60mg x 3 days then 50mg x 3days then 40mg x 3 days then 30mg x 3 days the 20mg x 3 days then 10mg x 3 days then discontinue.  Use with 20mg tabs 9 Tab 0    oxyCODONE-acetaminophen (PERCOCET) 5-325 mg per tablet TAKE 1 TABLET BY MOUTH EVERY 4 HOURS AS NEEDED (DO NOT EXCEED 4000MG OF ACETAMINOPHEN PER DAY.)         No Known Allergies    Review of Systems  As per HPI      Objective:  Visit Vitals  BP (!) 140/100   Pulse 73   Temp 98 °F (36.7 °C)   Resp 12   Ht 5' 2\" (1.575 m)   Wt 66.5 kg (146 lb 9.6 oz)   LMP 2021   SpO2 97%   BMI 26.81 kg/m²         Physical Exam:   General appearance - alert, well appearing, and in no distress  Mental status - alert, oriented to person, place, and time  EYE-NADIRA, EOMI  ENT-ENT exam normal, no neck nodes or sinus tenderness  Mouth - mucous membranes moist, pharynx normal without lesions  Neck - supple, no significant adenopathy   Chest - clear to auscultation, no wheezes, rales or rhonchi, symmetric air entry   Heart - normal rate and regular rhythm   Abdomen - soft, nontender, nondistended, no masses or organomegaly  Lymph- no adenopathy palpable  Ext-no pedal edema noted  Skin-Warm and dry. Neuro -alert, oriented, normal speech, no focal findings or movement disorder noted  Breast - no masses palapated b/l    Physical Exam     Diagnostic Imaging     No results found for this or any previous visit. Results for orders placed in visit on 19    XR CHEST PA LAT    [address] Radiology  [address] [place] [id number]  [phone]    Imaging Result    Name:  Jose Alejandro Ramos ([id number])  Sex: Female :  1985 Ordering Provider: Bartolo RODRIGUEZ Provider:   Diagnosis:  Pre-op chest exam [Z01.811 (ICD-10-CM)]  None Specified  Procedures Performed:  CHEST PA AND LATERAL  [id number] Exam Date/Time:  2019 10:06 AM      EXAM: CHEST PA AND LATERAL RADIOGRAPH    CLINICAL HISTORY/INDICATION: Preoperative evaluation    COMPARISON: 2016. TECHNIQUE: 2 view    FINDINGS:    There are no significant skeletal abnormalities. There are no pleural effusions. The heart and pulmonary vasculature is unremarkable. There is no hilar or mediastinal lymphadenopathy. There is no pulmonary edema, pneumothorax or focal opacity to suggest pneumonia.     Right ureteral stent. IMPRESSION    1. Stable chest. No acute cardiopulmonary disease. Signed By: Zenia Bennett MD on 2019 10:30 AM        Dictated by: Jose Juan Kan on  10:28:39 AM EDT  Signed By:Piedad Olivarez MD  2019 10:30 AM      Results for orders placed in visit on 19    CT ABD PELV W CONT    Impression  [facility][note 3] Cat Scan  [address] [place] [address]  [phone]    Imaging Result    Name:  Abdulaziz Villagomez ([id number])  Sex: Female :  1985 Ordering Provider: Jose Luis RODRIGUEZ Provider:   Diagnosis:    Abd pain, RLQ, appendicitis suspected  Procedures Performed:  CT ABD/PELVIS-IV ONLY  [id number] Exam Date/Time:  2019  3:59 AM      EXAM: CT ABDOMEN AND PELVIS WITH CONTRAST    CLINICAL INDICATION/HISTORY: Abd pain, RLQ, appendicitis suspected . Lower abdominal pain and dysuria. COMPARISON: CT abdomen/pelvis 2013    TECHNIQUE:  CT abdomen and pelvis with 100 cc of Omnipaque 350 IV contrast.  All CT scans at this facility are performed using dose optimization technique as appropriate to the performed examination, to include automated exposure control, adjustment of the mA and/or kV according to patient's size (including appropriate matching for site-specific examinations), or use of an iterative reconstruction technique. FINDINGS:  Lower chest: Minimal bibasilar atelectasis. Small hiatal hernia. Liver: Negative. Biliary: Negative. Pancreas: Negative. Spleen: Negative. Adrenal glands: Negative. Kidneys: Severe right hydronephrosis and hydroureter. No obstructing stone or mass is seen. The left kidney is unremarkable. Stomach, Small Bowel and Colon: Diverticulosis. No evidence for diverticulitis. No small bowel obstruction. Normal appendix. Bladder and Pelvic Organs:    Lymph nodes: No lymphadenopathy. Vessels: Unremarkable for age. Peritoneal Spaces:  No free fluid or free air. Body wall: Negative. Bones: Unremarkable for age. IMPRESSION    1. Severe right hydronephrosis and hydroureter to the level of the bladder, without clear etiology. Cortical thinning of the right kidney suggests this may be chronic. Recommend urologic evaluation and nonemergent CT urogram.    A notification that a wet reading is available was posted to the ED trackboard at 04:13 AM on 4/11/2019. Dictated ByKwame Santana on 4/11/2019 4:15 AM    As attending radiologist, I have assessed the study images, and dictated or reviewed/edited the final report as needed. Signed By: Clifford Rashid MD on 4/11/2019 4:24 AM        Dictated by: Nick Roper on Thu Apr 11, 2019  4:13:56 AM EDT  Signed By:Tabitha Marie MD  4/11/2019  4:24 AM      Lab Results  Lab Results   Component Value Date/Time    WBC 3.7 (L) 07/22/2021 10:20 AM    HGB 12.0 (L) 07/22/2021 10:20 AM    HCT 37.2 07/22/2021 10:20 AM    PLATELET 6 (LL) 00/63/9575 10:20 AM    MCV 82.9 07/22/2021 10:20 AM       Lab Results   Component Value Date/Time    Sodium 138 07/22/2021 10:20 AM    Potassium 4.0 07/22/2021 10:20 AM    Chloride 110 (H) 07/22/2021 10:20 AM    CO2 24 07/22/2021 10:20 AM    Anion gap 5 07/22/2021 10:20 AM    Glucose 77 07/22/2021 10:20 AM    BUN 15 07/22/2021 10:20 AM    Creatinine 1.1 07/22/2021 10:20 AM    BUN/Creatinine ratio 9 (L) 01/27/2021 04:28 PM    GFR est AA >60.0 07/22/2021 10:20 AM    GFR est non-AA 60 07/22/2021 10:20 AM    Calcium 8.7 07/22/2021 10:20 AM    Alk. phosphatase 39 (L) 07/22/2021 10:20 AM    Protein, total 7.7 07/22/2021 10:20 AM    Albumin 3.8 07/22/2021 10:20 AM    Globulin 4.8 (H) 10/13/2020 03:49 PM    A-G Ratio 0.7 (L) 10/13/2020 03:49 PM    ALT (SGPT) 15 07/22/2021 10:20 AM   Follow-up with PCP for health maintenance  Assessment/Plan:    ICD-10-CM ICD-9-CM    1.  Chronic ITP (idiopathic thrombocytopenia) (HCC)  D69.3 287.31 eltrombopag (PROMACTA) 50 mg tablet      CBC WITH AUTOMATED DIFF      H PYLORI ABS, IGA AND IGG      H PYLORI AG, STOOL      HIV 1/2 AG/AB, 4TH GENERATION,W RFLX CONFIRM      JAYDA LR VIRUS AB PANEL      US ABD COMP      METABOLIC PANEL, COMPREHENSIVE     Orders Placed This Encounter    US ABD COMP     Standing Status:   Future     Standing Expiration Date:   9/12/2022     Order Specific Question:   Is Patient Pregnant? Answer:   No    CBC WITH AUTOMATED DIFF     Standing Status:   Future     Standing Expiration Date:   8/13/2022    H PYLORI ABS, IGA AND IGG     Standing Status:   Future     Standing Expiration Date:   8/13/2022    H PYLORI AG, STOOL     Standing Status:   Future     Standing Expiration Date:   8/13/2022     Order Specific Question:   Specimen source     Answer:   Stool [235]    HIV 1/2 AG/AB, 4TH GENERATION,W RFLX CONFIRM     Standing Status:   Future     Standing Expiration Date:   8/13/2022    JAYDA BARR VIRUS AB PANEL     Standing Status:   Future     Standing Expiration Date:   1/02/6631    METABOLIC PANEL, COMPREHENSIVE     Standing Status:   Future     Standing Expiration Date:   8/13/2022    EluRyng 0.12-0.015 mg/24 hr vaginal ring     Sig: INSERT ONE RING VAGINALLY AND LEAVE IN PLACE FOR 3 CONSECUTIVE WEEKS THEN REMOVE FOR 1 WEEK. INSERT NEW RING 7 DAYS AFTER THE LAST WAS REMOVED    eltrombopag (PROMACTA) 50 mg tablet     Sig: Take 1 Tablet by mouth Daily (before breakfast) for 30 days. 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 Here for follow-up. I told patient that she has chronic ITP with multiple episodes of severe thrombocytopenia requiring platelets transfusion as well as bleeding with anemia.   I therefore proposed to   *start her on TPO mimetic with eltrombopag 50 mg p.o. daily to be taken 1 hour before or 2 hours after meal.  Discussed about this side effect including but not limited to hepatotoxicity, UTIs, diarrhea, cough, upper respiratory infections, decreased appetite, pruritus, peripheral edema, skin discoloration, venous thromboembolism, some hematologic malignancies etc.  All of her questions were answered. She agrees to continue with plan. *Check H. pylori serology  *Check ultrasound of the abdomen  *Check HIV and EBV  *CBC,CMP  **Check US abdomen    RTC 3 weeks to check for AEs  call if any problems  There are no Patient Instructions on file for this visit.        Lobo Noel MD

## 2021-08-19 DIAGNOSIS — D69.3 CHRONIC ITP (IDIOPATHIC THROMBOCYTOPENIA) (HCC): ICD-10-CM

## 2021-08-19 NOTE — TELEPHONE ENCOUNTER
Received a call from Moise Brown with [place] [address]. A new Rx for Promacta  will need to be sent to [facility] as they can cover the med.

## 2021-08-23 ENCOUNTER — TELEPHONE (OUTPATIENT)
Age: 36
End: 2021-08-23

## 2021-08-23 NOTE — TELEPHONE ENCOUNTER
Patient called to let office know that she never received [facility]

## 2021-08-23 NOTE — TELEPHONE ENCOUNTER
Spoke with Pt-10:22am.  Troy Daley was approved through insurance and originally sent to [facility]. Received a call from Terris Canavan with [employer] advising that Rx will need to be sent to [facility] and it was sent on 8/19. Provided Pt with [facility] phone number  to complete any shipping requirements. Pt  voiced understanding and stated that she will call today.

## 2021-08-24 DIAGNOSIS — D69.3 CHRONIC ITP (IDIOPATHIC THROMBOCYTOPENIA) (HCC): ICD-10-CM

## 2021-08-24 NOTE — TELEPHONE ENCOUNTER
Patient called Sheryl Rosenthal yesterday about her prescription Promacta. The pharmacy states they have no record of the prescription being sent. Patient is inquiring if she will have to reschedule her appoint on 9/1/21 since she has not started the medicine.

## 2021-08-24 NOTE — TELEPHONE ENCOUNTER
Rx must be sent to [address] [place] [address] in [place] as they cover speciality meds. Please send pended order to [facility]. -Thank you  Requested Prescriptions     Pending Prescriptions Disp Refills    eltrombopag (PROMACTA) 50 mg tablet 30 Tablet 5     Sig: Take 1 Tablet by mouth Daily (before breakfast) for 30 days.

## 2021-08-27 NOTE — TELEPHONE ENCOUNTER
Received a call from Beata Flores with [employer] advising that  [facility] cannot dispense Promacata and Rx will need to be sent to [facility]. Please send pended Rx to [facility]. - Thank you! Requested Prescriptions     Pending Prescriptions Disp Refills    eltrombopag (PROMACTA) 50 mg tablet 30 Tablet 5     Sig: Take 1 Tablet by mouth Daily (before breakfast) for 30 days.

## 2021-08-29 LAB
ALBUMIN SERPL-MCNC: 4.4 G/DL (ref 3.8–4.8)
ALBUMIN/GLOB SERPL: 1.4 {RATIO} (ref 1.2–2.2)
ALP SERPL-CCNC: 49 IU/L (ref 48–121)
ALT SERPL-CCNC: 11 IU/L (ref 0–32)
AST SERPL-CCNC: 12 IU/L (ref 0–40)
BASOPHILS # BLD AUTO: 0 X10E3/UL (ref 0–0.2)
BASOPHILS NFR BLD AUTO: 0 %
BILIRUB SERPL-MCNC: 0.3 MG/DL (ref 0–1.2)
BUN SERPL-MCNC: 14 MG/DL (ref 6–20)
BUN/CREAT SERPL: 13 (ref 9–23)
CALCIUM SERPL-MCNC: 9.2 MG/DL (ref 8.7–10.2)
CHLORIDE SERPL-SCNC: 104 MMOL/L (ref 96–106)
CO2 SERPL-SCNC: 21 MMOL/L (ref 20–29)
CREAT SERPL-MCNC: 1.07 MG/DL (ref 0.57–1)
EBV NA IGG SER IA-ACNC: >600 U/ML (ref 0–17.9)
EBV VCA IGG SER IA-ACNC: >600 U/ML (ref 0–17.9)
EBV VCA IGM SER IA-ACNC: <36 U/ML (ref 0–35.9)
EOSINOPHIL # BLD AUTO: 0.1 X10E3/UL (ref 0–0.4)
EOSINOPHIL NFR BLD AUTO: 3 %
ERYTHROCYTE [DISTWIDTH] IN BLOOD BY AUTOMATED COUNT: 12.4 % (ref 11.7–15.4)
FERRITIN SERPL-MCNC: 532 NG/ML (ref 15–150)
GLOBULIN SER CALC-MCNC: 3.2 G/DL (ref 1.5–4.5)
GLUCOSE SERPL-MCNC: 85 MG/DL (ref 65–99)
H PYLORI IGA SER-ACNC: <9 UNITS (ref 0–8.9)
H PYLORI IGG SER IA-ACNC: 0.42 INDEX VALUE (ref 0–0.79)
HCT VFR BLD AUTO: 39.7 % (ref 34–46.6)
HGB BLD-MCNC: 12.9 G/DL (ref 11.1–15.9)
HIV 1+2 AB+HIV1 P24 AG SERPL QL IA: NON REACTIVE
IMM GRANULOCYTES # BLD AUTO: 0 X10E3/UL (ref 0–0.1)
IMM GRANULOCYTES NFR BLD AUTO: 0 %
IRON SATN MFR SERPL: 38 % (ref 15–55)
IRON SERPL-MCNC: 87 UG/DL (ref 27–159)
LYMPHOCYTES # BLD AUTO: 1.7 X10E3/UL (ref 0.7–3.1)
LYMPHOCYTES NFR BLD AUTO: 35 %
MCH RBC QN AUTO: 27.6 PG (ref 26.6–33)
MCHC RBC AUTO-ENTMCNC: 32.5 G/DL (ref 31.5–35.7)
MCV RBC AUTO: 85 FL (ref 79–97)
MONOCYTES # BLD AUTO: 0.4 X10E3/UL (ref 0.1–0.9)
MONOCYTES NFR BLD AUTO: 9 %
MORPHOLOGY BLD-IMP: NORMAL
NEUTROPHILS # BLD AUTO: 2.5 X10E3/UL (ref 1.4–7)
NEUTROPHILS NFR BLD AUTO: 53 %
PLATELET # BLD AUTO: NORMAL X10E3/UL (ref 150–450)
POTASSIUM SERPL-SCNC: 3.7 MMOL/L (ref 3.5–5.2)
PROT SERPL-MCNC: 7.6 G/DL (ref 6–8.5)
RBC # BLD AUTO: 4.68 X10E6/UL (ref 3.77–5.28)
SERVICE CMNT-IMP: ABNORMAL
SODIUM SERPL-SCNC: 139 MMOL/L (ref 134–144)
SPECIMEN STATUS REPORT, ROLRST: NORMAL
TIBC SERPL-MCNC: 228 UG/DL (ref 250–450)
UIBC SERPL-MCNC: 141 UG/DL (ref 131–425)
WBC # BLD AUTO: 4.7 X10E3/UL (ref 3.4–10.8)

## 2021-09-08 ENCOUNTER — TELEPHONE (OUTPATIENT)
Age: 36
End: 2021-09-08

## 2021-09-08 NOTE — TELEPHONE ENCOUNTER
Received a call from Allyson Cart with [facility] advising that [facility] tried reaching out to Pt regarding Promacta medication and left vms but have not heard from Pt. Contacted Pt to ask if she spoke with anyone from [facility]. Pt states \"I spoke with them and they still have not sent the med. I'm not going to keep calling. \" I advised that unfortunately, Pt would have to call the pharmacy to complete any monthly shipment requirement. Provided the phone number and advised to call. Contacted [facility] who states that they have not been able to get in contact with Pt to deliver the medication. Asked if letter could be faxed to office stating that Pt was unreachable.

## 2021-09-16 ENCOUNTER — OFFICE VISIT (OUTPATIENT)
Age: 36
End: 2021-09-16
Payer: COMMERCIAL

## 2021-09-16 VITALS
OXYGEN SATURATION: 97 % | DIASTOLIC BLOOD PRESSURE: 88 MMHG | HEART RATE: 82 BPM | RESPIRATION RATE: 15 BRPM | BODY MASS INDEX: 27.57 KG/M2 | HEIGHT: 62 IN | WEIGHT: 149.8 LBS | TEMPERATURE: 98.3 F | SYSTOLIC BLOOD PRESSURE: 128 MMHG

## 2021-09-16 DIAGNOSIS — D69.6 THROMBOCYTOPENIA (HCC): ICD-10-CM

## 2021-09-16 DIAGNOSIS — D69.6 THROMBOCYTOPENIA (HCC): Primary | ICD-10-CM

## 2021-09-16 DIAGNOSIS — D50.9 IRON DEFICIENCY ANEMIA, UNSPECIFIED IRON DEFICIENCY ANEMIA TYPE: ICD-10-CM

## 2021-09-16 PROCEDURE — 99214 OFFICE O/P EST MOD 30 MIN: CPT | Performed by: INTERNAL MEDICINE

## 2021-09-16 RX ORDER — TRANEXAMIC ACID 650 1/1
1300 TABLET ORAL 3 TIMES DAILY
Qty: 30 TABLET | Refills: 1 | Status: SHIPPED | OUTPATIENT
Start: 2021-09-16 | End: 2021-11-29

## 2021-09-16 NOTE — PROGRESS NOTES
Jose Martinez is a 37 y.o.  female who I was asked to see in consultation at the request of Dr. Christina Dozier for evaluation for severe thrombocytopenia from ITP.     Mrs. Mary Nicholas is well-known to me. She has history of ITP. She presented yesterday in clinic with complaint of large thigh ecchymosis and epistaxis. I checked CBC and platelet came back at 5000. Patient was then sent to ER to be admitted for platelets, prednisone and IVIG. I saw the patient in consultation in January 2021, and she is finally here for follow-up. She was also in the hospital on 7/29/2021 with heavy bruising and platelet with 0514. She received platelet transfusion.     Today on review of systems she has  no new ecchymosis. Has mild petechiae. No overt petechiae. Has occasional nose bleed. No melena or bright red blood per rectum. All other point of review of system have been reviewed and were negative.       Past Medical History:   Diagnosis Date    Hydronephrosis     Idiopathic thrombocytopenic purpura (ITP) (HCC)     Leukopenia     Ovarian cyst     Thrombocytopenia (HCC)      Past Surgical History:   Procedure Laterality Date    HX COLONOSCOPY      HX NEPHRECTOMY  06/27/2019    Laparoscopic right nephrectomy    HX OTHER SURGICAL       CYSTOSCOPY, WITH RIGHT RETROGRADE PYELOGRAM AND RIGHT URETERAL STENT REPLACEMENT     Social History     Socioeconomic History    Marital status: SINGLE     Spouse name: Not on file    Number of children: Not on file    Years of education: Not on file    Highest education level: Not on file   Tobacco Use    Smoking status: Never Smoker    Smokeless tobacco: Never Used   Substance and Sexual Activity    Alcohol use:  Yes    Drug use: Never    Sexual activity: Yes     Social Determinants of Health     Financial Resource Strain:     Difficulty of Paying Living Expenses:    Food Insecurity:     Worried About Running Out of Food in the Last Year:     [address] in the Last Year:    Transportation Needs:     Lack of Transportation (Medical):  Lack of Transportation (Non-Medical):    Physical Activity:     Days of Exercise per Week:     Minutes of Exercise per Session:    Stress:     Feeling of Stress :    Social Connections:     Frequency of Communication with Friends and Family:     Frequency of Social Gatherings with Friends and Family:     Attends Faith Services:     Active Member of Clubs or Organizations:     Attends Club or Organization Meetings:     Marital Status:      Family History   Problem Relation Age of Onset    Hypertension Mother        Current Outpatient Medications   Medication Sig Dispense Refill    eltrombopag (PROMACTA) 50 mg tablet Take 1 Tablet by mouth Daily (before breakfast) for 30 days. 30 Tablet 5    EluRyng 0.12-0.015 mg/24 hr vaginal ring INSERT ONE RING VAGINALLY AND LEAVE IN PLACE FOR 3 CONSECUTIVE WEEKS THEN REMOVE FOR 1 WEEK. INSERT NEW RING 7 DAYS AFTER THE LAST WAS REMOVED      predniSONE (DELTASONE) 20 mg tablet Slow taper: Take 60mg x 3 days then 50mg x 3days then 40mg x 3 days then 30mg x 3 days the 20mg x 3 days then 10mg x 3 days then discontinue. Use with 10mg tabs 27 Tab 0    predniSONE (DELTASONE) 10 mg tablet Take 10 mg by mouth daily (with breakfast). Slow taper: Take 60mg x 3 days then 50mg x 3days then 40mg x 3 days then 30mg x 3 days the 20mg x 3 days then 10mg x 3 days then discontinue. Use with 20mg tabs 9 Tab 0    oxyCODONE-acetaminophen (PERCOCET) 5-325 mg per tablet TAKE 1 TABLET BY MOUTH EVERY 4 HOURS AS NEEDED (DO NOT EXCEED 4000MG OF ACETAMINOPHEN PER DAY. )      VITAMIN D2 50,000 unit capsule take 1 capsule by mouth every week for 4 weeks  0    hydrOXYzine HCl (ATARAX) 10 mg tablet Take 10 mg by mouth two (2) times daily as needed. 0    acetaminophen (TYLENOL) 325 mg tablet Take  by mouth every four (4) hours as needed for Pain.          No Known Allergies    Review of Systems  As per HPI      Objective: There were no vitals taken for this visit. Physical Exam:   General appearance - alert, well appearing, and in no distress  Mental status - alert, oriented to person, place, and time  EYE-NADIRA, EOMI  ENT-ENT exam normal, no neck nodes or sinus tenderness  Mouth - mucous membranes moist, pharynx normal without lesions  Neck - supple, no significant adenopathy   Chest - clear to auscultation, no wheezes, rales or rhonchi, symmetric air entry   Heart - normal rate and regular rhythm   Abdomen - soft, nontender, nondistended, no masses or organomegaly  Lymph- no adenopathy palpable  Ext-no pedal edema noted  Skin-Warm and dry. Neuro -alert, oriented, normal speech, no focal findings or movement disorder noted  Breast - no masses palapated b/l    Physical Exam     Diagnostic Imaging     No results found for this or any previous visit. Results for orders placed in visit on 19    XR CHEST PA LAT    [address] [facility]iology  [address] [facility] [address] [id number]  [phone]    Imaging Result    Name:  Sandy Cardoso ([id number])  Sex: Female :  1985 Ordering Provider: Greg RODRIGUEZ Provider:   Diagnosis:  Pre-op chest exam [Z01.811 (ICD-10-CM)]  None Specified  Procedures Performed:  CHEST PA AND LATERAL  [id number] Exam Date/Time:  2019 10:06 AM      EXAM: CHEST PA AND LATERAL RADIOGRAPH    CLINICAL HISTORY/INDICATION: Preoperative evaluation    COMPARISON: 2016. TECHNIQUE: 2 view    FINDINGS:    There are no significant skeletal abnormalities. There are no pleural effusions. The heart and pulmonary vasculature is unremarkable. There is no hilar or mediastinal lymphadenopathy. There is no pulmonary edema, pneumothorax or focal opacity to suggest pneumonia. Right ureteral stent. IMPRESSION    1. Stable chest. No acute cardiopulmonary disease.           Signed By: Robert Lewis MD on 2019 10:30 AM        Dictated by: Jose Juan Kan on  10:28:39 AM EDT  Signed By:Piedad Olivarez MD  2019 10:30 AM      Results for orders placed in visit on 19    CT ABD PELV W CONT    Impression  [address] [facility] Cat Scan  [address] [facility] [address]  [phone]    Imaging Result    Name:  Abdulaziz Villagomez ([id number])  Sex: Female :  1985 Ordering Provider: Jose Luis RODRIGUEZ Provider:   Diagnosis:    Abd pain, RLQ, appendicitis suspected  Procedures Performed:  CT ABD/PELVIS-IV ONLY  [id number] Exam Date/Time:  2019  3:59 AM      EXAM: CT ABDOMEN AND PELVIS WITH CONTRAST    CLINICAL INDICATION/HISTORY: Abd pain, RLQ, appendicitis suspected . Lower abdominal pain and dysuria. COMPARISON: CT abdomen/pelvis 2013    TECHNIQUE:  CT abdomen and pelvis with 100 cc of Omnipaque 350 IV contrast.  All CT scans at this facility are performed using dose optimization technique as appropriate to the performed examination, to include automated exposure control, adjustment of the mA and/or kV according to patient's size (including appropriate matching for site-specific examinations), or use of an iterative reconstruction technique. FINDINGS:  Lower chest: Minimal bibasilar atelectasis. Small hiatal hernia. Liver: Negative. Biliary: Negative. Pancreas: Negative. Spleen: Negative. Adrenal glands: Negative. Kidneys: Severe right hydronephrosis and hydroureter. No obstructing stone or mass is seen. The left kidney is unremarkable. Stomach, Small Bowel and Colon: Diverticulosis. No evidence for diverticulitis. No small bowel obstruction. Normal appendix. Bladder and Pelvic Organs:    Lymph nodes: No lymphadenopathy. Vessels: Unremarkable for age. Peritoneal Spaces: No free fluid or free air. Body wall: Negative. Bones: Unremarkable for age. IMPRESSION    1.  Severe right hydronephrosis and hydroureter to the level of the bladder, without clear etiology. Cortical thinning of the right kidney suggests this may be chronic. Recommend urologic evaluation and nonemergent CT urogram.    A notification that a wet reading is available was posted to the ED trackboard at 04:13 AM on 4/11/2019. Dictated BySierra Smalls on 4/11/2019 4:15 AM    As attending radiologist, I have assessed the study images, and dictated or reviewed/edited the final report as needed. Signed By: Sheeba Schneider MD on 4/11/2019 4:24 AM        Dictated by: Russ Mcmillan on Thu Apr 11, 2019  4:13:56 AM EDT  Signed By:Gianni Marie MD  4/11/2019  4:24 AM      Lab Results  Lab Results   Component Value Date/Time    WBC 4.7 08/25/2021 08:45 AM    HGB 12.9 08/25/2021 08:45 AM    HCT 39.7 08/25/2021 08:45 AM    PLATELET Comment 70/59/1079 08:45 AM    MCV 85 08/25/2021 08:45 AM       Lab Results   Component Value Date/Time    Sodium 139 08/25/2021 08:45 AM    Potassium 3.7 08/25/2021 08:45 AM    Chloride 104 08/25/2021 08:45 AM    CO2 21 08/25/2021 08:45 AM    Anion gap 5 07/22/2021 10:20 AM    Glucose 85 08/25/2021 08:45 AM    BUN 14 08/25/2021 08:45 AM    Creatinine 1.07 (H) 08/25/2021 08:45 AM    BUN/Creatinine ratio 13 08/25/2021 08:45 AM    GFR est AA 78 08/25/2021 08:45 AM    GFR est non-AA 68 08/25/2021 08:45 AM    Calcium 9.2 08/25/2021 08:45 AM    Alk. phosphatase 49 08/25/2021 08:45 AM    Protein, total 7.6 08/25/2021 08:45 AM    Albumin 4.4 08/25/2021 08:45 AM    Globulin 4.8 (H) 10/13/2020 03:49 PM    A-G Ratio 1.4 08/25/2021 08:45 AM    ALT (SGPT) 11 08/25/2021 08:45 AM   Follow-up with PCP for health maintenance  Assessment/Plan:    ICD-10-CM ICD-9-CM    1. Thrombocytopenia (HCC)  D69.6 287.5 CBC WITH AUTOMATED DIFF      CBC WITH AUTOMATED DIFF     No orders of the defined types were placed in this encounter.     1.  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 Here for follow-up. I told patient that she has chronic ITP with multiple episodes of severe thrombocytopenia requiring platelets transfusion as well as bleeding with anemia. I therefore proposed to   *started TPO mimetic with eltrombopag 50 mg p.o. daily to be taken 1 hour before or 2 hours after meal on 9/13/21 (delayed due to insurance issues)   *Ultrasound of the abdomen on 8/18/2021 showed right  nephrectomy otherwise liver and spleen were normal.  *Labs on 8/25/2021 showed a platelet of 6 \"unable to perform verification on the peripheral smear due to platelet clumping. H pylori negative. Negative HIV screening. EBV positive but only with IgG antibodies BUN 14, creatinine 1.07. HIV testing negative. Ferritin 532, transferrin saturation 38%. --Check CBC today on both citrate and EDTA tubes due to previous platelet clumping. Patient might have a component of pseudothrombocytopenia. I will call patient with results once available      RTC 4 weeks with repeat labs. call if any problems  There are no Patient Instructions on file for this visit.        Cortney Peters MD

## 2021-10-08 LAB
BASOPHILS # BLD AUTO: 0 X10E3/UL (ref 0–0.2)
BASOPHILS NFR BLD AUTO: 1 %
EOSINOPHIL # BLD AUTO: 0.1 X10E3/UL (ref 0–0.4)
EOSINOPHIL NFR BLD AUTO: 2 %
ERYTHROCYTE [DISTWIDTH] IN BLOOD BY AUTOMATED COUNT: 13.1 % (ref 11.7–15.4)
FACT VIII ACT/NOR PPP: 126 % (ref 56–140)
HCT VFR BLD AUTO: 36.4 % (ref 34–46.6)
HGB BLD-MCNC: 12.2 G/DL (ref 11.1–15.9)
IMM GRANULOCYTES # BLD AUTO: 0 X10E3/UL (ref 0–0.1)
IMM GRANULOCYTES NFR BLD AUTO: 0 %
LYMPHOCYTES # BLD AUTO: 2.2 X10E3/UL (ref 0.7–3.1)
LYMPHOCYTES NFR BLD AUTO: 44 %
MCH RBC QN AUTO: 27.7 PG (ref 26.6–33)
MCHC RBC AUTO-ENTMCNC: 33.5 G/DL (ref 31.5–35.7)
MCV RBC AUTO: 83 FL (ref 79–97)
MONOCYTES # BLD AUTO: 0.5 X10E3/UL (ref 0.1–0.9)
MONOCYTES NFR BLD AUTO: 11 %
NEUTROPHILS # BLD AUTO: 2.1 X10E3/UL (ref 1.4–7)
NEUTROPHILS NFR BLD AUTO: 42 %
PATH INTERP BLD-IMP: NORMAL
PLATELET # BLD AUTO: 220 X10E3/UL (ref 150–450)
RBC # BLD AUTO: 4.4 X10E6/UL (ref 3.77–5.28)
VWF AG ACT/NOR PPP IA: 149 % (ref 50–200)
VWF:RCO ACT/NOR PPP PL AGG: 92 % (ref 50–200)
WBC # BLD AUTO: 4.9 X10E3/UL (ref 3.4–10.8)

## 2021-10-27 ENCOUNTER — TELEPHONE (OUTPATIENT)
Age: 36
End: 2021-10-27

## 2021-11-22 ENCOUNTER — TELEPHONE (OUTPATIENT)
Age: 36
End: 2021-11-22

## 2021-11-22 NOTE — TELEPHONE ENCOUNTER
Left a message for patient to call office back to get set up for lab work before appointment on 12/2.

## 2021-12-09 PROBLEM — D25.9 UTERINE FIBROID: Status: ACTIVE | Noted: 2021-12-09

## 2022-02-09 ENCOUNTER — TELEPHONE (OUTPATIENT)
Age: 37
End: 2022-02-09

## 2022-02-09 DIAGNOSIS — D69.3 CHRONIC ITP (IDIOPATHIC THROMBOCYTOPENIA) (HCC): ICD-10-CM

## 2022-02-09 DIAGNOSIS — D69.6 THROMBOCYTOPENIA (HCC): Primary | ICD-10-CM

## 2022-02-09 DIAGNOSIS — D50.9 IRON DEFICIENCY ANEMIA, UNSPECIFIED IRON DEFICIENCY ANEMIA TYPE: ICD-10-CM

## 2022-02-14 ENCOUNTER — OFFICE VISIT (OUTPATIENT)
Age: 37
End: 2022-02-14
Payer: COMMERCIAL

## 2022-02-14 VITALS
TEMPERATURE: 98 F | HEIGHT: 62 IN | WEIGHT: 146.6 LBS | DIASTOLIC BLOOD PRESSURE: 89 MMHG | HEART RATE: 73 BPM | RESPIRATION RATE: 15 BRPM | OXYGEN SATURATION: 100 % | SYSTOLIC BLOOD PRESSURE: 137 MMHG | BODY MASS INDEX: 26.98 KG/M2

## 2022-02-14 DIAGNOSIS — D69.3 CHRONIC ITP (IDIOPATHIC THROMBOCYTOPENIA) (HCC): Primary | ICD-10-CM

## 2022-02-14 DIAGNOSIS — D50.9 IRON DEFICIENCY ANEMIA, UNSPECIFIED IRON DEFICIENCY ANEMIA TYPE: ICD-10-CM

## 2022-02-14 DIAGNOSIS — N92.4 EXCESSIVE BLEEDING IN PREMENOPAUSAL PERIOD: ICD-10-CM

## 2022-02-14 PROCEDURE — 99214 OFFICE O/P EST MOD 30 MIN: CPT | Performed by: INTERNAL MEDICINE

## 2022-02-14 NOTE — PROGRESS NOTES
Hematology/Oncology  Progress Note    Name: Donna Billing  Date: 2022  : 1985  Primary Care Provider: Claudis Simmonds, NP    Ms. Sheldon Ruiz is a 31y.o. year old female with ITP and IDAS    Current Therapy: eltrombopag aka Promacta    Subjective:     Silvana Ludwig a 37 y.o.  female who I was asked to see in consultation at the request of Dr. Carmen Rosen evaluation for severe thrombocytopenia from ITP.     Mrs. Sheldon Ruiz is well-known to me. Danae Blair has history of ITP.  She presented yesterday in clinic with complaint of large thigh ecchymosis and epistaxis.  I checked CBC and platelet came back at 5000.  Patient was then sent to ER to be admitted for platelets, prednisone and IVIG. I saw the patient in consultation in 2021, and she is finally here for follow-up.     She was also in the hospital on 2021 with heavy bruising and platelet with 8252. She received platelet transfusion.         The past medical, surgical and social history has been reviewed and remains unchanged. Past Medical History:   Diagnosis Date    Anemia, iron deficiency     Hydronephrosis     Idiopathic thrombocytopenic purpura (ITP) (HCC)     Leiomyoma of uterus, unspecified     Leukopenia     Ovarian cyst     Thrombocytopenia (HCC)      Past Surgical History:   Procedure Laterality Date    HX COLONOSCOPY      HX NEPHRECTOMY  2019    Laparoscopic right nephrectomy    HX OTHER SURGICAL       CYSTOSCOPY, WITH RIGHT RETROGRADE PYELOGRAM AND RIGHT URETERAL STENT REPLACEMENT    IR OCCL TXCATH ORGAN W SI  2021     Social History     Socioeconomic History    Marital status: SINGLE     Spouse name: Not on file    Number of children: Not on file    Years of education: Not on file    Highest education level: Not on file   Occupational History    Not on file   Tobacco Use    Smoking status: Never Smoker    Smokeless tobacco: Never Used   Substance and Sexual Activity    Alcohol use:  Yes    Drug use: Never    Sexual activity: Yes   Other Topics Concern     Service Not Asked    Blood Transfusions Not Asked    Caffeine Concern Not Asked    Occupational Exposure Not Asked    Hobby Hazards Not Asked    Sleep Concern Not Asked    Stress Concern Not Asked    Weight Concern Not Asked    Special Diet Not Asked    Back Care Not Asked    Exercise Not Asked    Bike Helmet Not Asked   [address] Not Asked    Self-Exams Not Asked   Social History Narrative    Not on file     Social Determinants of Health     Financial Resource Strain:     Difficulty of Paying Living Expenses: Not on file   Food Insecurity:     Worried About Running Out of Food in the Last Year: Not on file    Emily of Food in the Last Year: Not on file   Transportation Needs:     Lack of Transportation (Medical): Not on file    Lack of Transportation (Non-Medical):  Not on file   Physical Activity:     Days of Exercise per Week: Not on file    Minutes of Exercise per Session: Not on file   Stress:     Feeling of Stress : Not on file   Social Connections:     Frequency of Communication with Friends and Family: Not on file    Frequency of Social Gatherings with Friends and Family: Not on file    Attends Synagogue Services: Not on file    Active Member of [address] [place] [address] [employer] or Organizations: Not on file    Attends Club or Organization Meetings: Not on file    Marital Status: Not on file   Intimate Partner Violence:     Fear of Current or Ex-Partner: Not on file    Emotionally Abused: Not on file    Physically Abused: Not on file    Sexually Abused: Not on file   Housing Stability:     Unable to Pay for Housing in the Last Year: Not on file    Number of [place] in the Last Year: Not on file    Unstable Housing in the Last Year: Not on file     Family History   Problem Relation Age of Onset    Hypertension Mother      Current Outpatient Medications   Medication Sig Dispense Refill    scopolamine (TRANSDERM-SCOP) 1 mg over 3 days pt3d 1 Patch by TransDERmal route every seventy-two (72) hours.  eltrombopag (Promacta) 50 mg tablet Take 50 mg by mouth Daily (before breakfast).  gabapentin (NEURONTIN) 300 mg capsule Take 1 Capsule by mouth two (2) times a day. Max Daily Amount: 600 mg. 14 Capsule 0    metoprolol tartrate (LOPRESSOR) 25 mg tablet Take 25 mg by mouth two (2) times a day.  levonorgestreL (Mirena) 20 mcg/24 hours (7 yrs) 52 mg IUD 1 Device by IntraUTERine route once.  acetaminophen (TYLENOL) 325 mg tablet Take  by mouth every four (4) hours as needed for Pain. Review of Systems:    General :The patient has no complaints and there is no physical distress evident. Psychological : patient denies having any psychological symptoms such as hallucinations depression or anxiety. Ophthalmic:the patient denies having any visual impairment or eye discomfort. ENT: there are no abnormalities reported. Allergy and Immunology:the patient denies having any seasonal allergies or allergies to medications other than those already outlined above. Hematological and Lymphatic: the patient denies having any bruising, bleeding or lymphadenopathy. Endocrine: the patient denies having any heat or cold intolerance. There is no history of diabetes or thyroid disorders. Breast: the patient denies having any history of breast mass or lumps. Respiratory:the patient denies having any cough, shortness of breath, or dyspnea on exertion. Cardiovascular: there are no complaints of chest pain, palpitations, chest pounding, or dyspnea on exertion. Gastrointestinal: the patient denies having nausea, emesis, diarrhea, constipation, or blood in the stool. Genito-Urinary: the patient denies having urinary urgency, frequency, or dysuria. Musculoskeletal: with the exception of mild arthralgias the patient has no other musculoskeletal complaints.      Neurological:  denies having any numbness, tingling, or neurologic deficits. Dermatological: patient denies having any unexplained rash, skin ulcerations, or hives. Objective:     Visit Vitals  /89   Pulse 73   Temp 98 °F (36.7 °C)   Resp 15   Ht 5' 2\" (1.575 m)   Wt 66.5 kg (146 lb 9.6 oz)   SpO2 100%   BMI 26.81 kg/m²     Pain Score: 0    Physical Exam:     ECO    General: Well appearing, in NAD    Psychologic: mood and affect are appropriate, no anxiety or depression noted    Skin: examination of the skin reveals no bruising, rash or petechiae    HEENT: Normocephalic, atraumatic. Conjunctiva and sclera are clear. Pupils are equal, round and reactive to light. EOMs are intact. ENT without oral mucosal lesions, stomatitis or thrush    Neck: supple without lymphadenopathy, JVD or thyromegaly    Lymphatics: no palpable cervical, supraclavicular, infraclavicular, axillary or inguinal lymphadenopathy    Lungs: clear breath sounds bilaterally, no rhonchi or wheezes noted    Heart: Regular rate and rhythm,S1-S2 noted. Abdomen: soft, non-tender, non-distended, no HSM, positive bowel sounds    Extremities: without clubbing, cyanosis or edema    Neurologic: no focal deficits, steady gait, Alert and oriented x 3. Laboratory Data:     Results for orders placed or performed during the hospital encounter of 21   CBC WITH 3 PART DIFF     Status: None   Result Value Ref Range Status    WBC 7.0 4.5 - 13.0 K/uL Final    RBC 4.54 4.10 - 5.10 M/uL Final    HGB 12.6 12.0 - 16.0 g/dL Final    HCT 39.4 36 - 48 % Final    MCV 86.8 78 - 102 FL Final    MCH 27.8 25.0 - 35.0 PG Final    MCHC 32.0 31 - 37 g/dL Final    RDW 14.4 11.5 - 14.5 % Final    PLATELET 647 638 - 751 K/uL Final    NEUTROPHILS 50 40 - 70 % Final    MIXED CELLS 11 0.1 - 17 % Final    LYMPHOCYTES 39 14 - 44 % Final    ABS. NEUTROPHILS 3.5 1.8 - 9.5 K/UL Final    ABS. MIXED CELLS 0.8 0.0 - 2.3 K/uL Final    ABS.  LYMPHOCYTES 2.7 1.1 - 5.9 K/UL Final     Comment: Test performed at [address] [place] [address] or Outpatient [facility]fusion [facility] Location. Reviewed by Medical Director. DF AUTOMATED   Final       Patient Active Problem List   Diagnosis Code    Flank pain R10.9    Thrombocytopenia (HCC) D69.6    Iron deficiency anemia D50.9    Excessive bleeding in premenopausal period N92.4    Adnexal mass N94.89    Acute ITP (HCC) D69.3    Uterine fibroid D25.9         Assessment:     1. Chronic ITP (idiopathic thrombocytopenia) (HCC)    2. Excessive bleeding in premenopausal period    3. Iron deficiency anemia, unspecified iron deficiency anemia type        Plan:     1. Obtain blood test since patient is denying any bleeding or bruising,  tomorrow. 2. Patient continue her current medication eltrombopag aka Promacta  3. The patient's had blood test to look acceptable patient will return in 4 months with the labs. 4. Should she develop any new evidence of low platelet count she will return uncharted. 5. Patient is in agreement with this plan    Follow-up and Dispositions  ·   Return in about 4 months (around 6/14/2022) for Follow_up In Person, Follow up with labs.         Orders Placed This Encounter    CBC WITH AUTOMATED DIFF     Standing Status:   Future     Standing Expiration Date:   2/15/2023    FERRITIN     Standing Status:   Future     Standing Expiration Date:   2/15/2023    VITAMIN D, 25 HYDROXY     Standing Status:   Future     Standing Expiration Date:   2/15/2023    VITAMIN B12 & FOLATE     Standing Status:   Future     Standing Expiration Date:   3/72/2256    METABOLIC PANEL, BASIC     Standing Status:   Future     Standing Expiration Date:   2/15/2023    IRON PROFILE     Standing Status:   Future     Standing Expiration Date:   2/15/2023    CBC WITH AUTOMATED DIFF     Standing Status:   Future     Standing Expiration Date:   2/15/2023    FERRITIN     Standing Status:   Future     Standing Expiration Date:   2/15/2023    VITAMIN D, 25 HYDROXY     Standing Status:   Future     Standing Expiration Date:   2/15/2023    VITAMIN B12 & FOLATE     Standing Status:   Future     Standing Expiration Date:   3/06/5166    METABOLIC PANEL, COMPREHENSIVE     Standing Status:   Future     Standing Expiration Date:   2/15/2023    IRON PROFILE     Standing Status:   Future     Standing Expiration Date:   2/15/2023       Tulio Abarca MD  2/14/2022

## 2022-02-15 DIAGNOSIS — D50.9 IRON DEFICIENCY ANEMIA, UNSPECIFIED IRON DEFICIENCY ANEMIA TYPE: ICD-10-CM

## 2022-02-15 DIAGNOSIS — N92.4 EXCESSIVE BLEEDING IN PREMENOPAUSAL PERIOD: ICD-10-CM

## 2022-02-15 DIAGNOSIS — D69.3 CHRONIC ITP (IDIOPATHIC THROMBOCYTOPENIA) (HCC): ICD-10-CM

## 2022-02-21 DIAGNOSIS — E55.9 VITAMIN D DEFICIENCY: Primary | ICD-10-CM

## 2022-02-21 LAB
25(OH)D3+25(OH)D2 SERPL-MCNC: 14.2 NG/ML (ref 30–100)
BASOPHILS # BLD AUTO: 0 X10E3/UL (ref 0–0.2)
BASOPHILS NFR BLD AUTO: 1 %
BUN SERPL-MCNC: 12 MG/DL (ref 6–20)
BUN/CREAT SERPL: 12 (ref 9–23)
CALCIUM SERPL-MCNC: 9.3 MG/DL (ref 8.7–10.2)
CHLORIDE SERPL-SCNC: 103 MMOL/L (ref 96–106)
CO2 SERPL-SCNC: 20 MMOL/L (ref 20–29)
CREAT SERPL-MCNC: 1.01 MG/DL (ref 0.57–1)
EOSINOPHIL # BLD AUTO: 0.1 X10E3/UL (ref 0–0.4)
EOSINOPHIL NFR BLD AUTO: 1 %
ERYTHROCYTE [DISTWIDTH] IN BLOOD BY AUTOMATED COUNT: 13.7 % (ref 11.7–15.4)
FERRITIN SERPL-MCNC: 342 NG/ML (ref 15–150)
FOLATE SERPL-MCNC: 9.5 NG/ML
GLUCOSE SERPL-MCNC: 82 MG/DL (ref 65–99)
HCT VFR BLD AUTO: 39.5 % (ref 34–46.6)
HGB BLD-MCNC: 12.4 G/DL (ref 11.1–15.9)
IMM GRANULOCYTES # BLD AUTO: 0 X10E3/UL (ref 0–0.1)
IMM GRANULOCYTES NFR BLD AUTO: 0 %
IRON SATN MFR SERPL: 52 % (ref 15–55)
IRON SERPL-MCNC: 173 UG/DL (ref 27–159)
LYMPHOCYTES # BLD AUTO: 2.1 X10E3/UL (ref 0.7–3.1)
LYMPHOCYTES NFR BLD AUTO: 51 %
MCH RBC QN AUTO: 26.2 PG (ref 26.6–33)
MCHC RBC AUTO-ENTMCNC: 31.4 G/DL (ref 31.5–35.7)
MCV RBC AUTO: 84 FL (ref 79–97)
MONOCYTES # BLD AUTO: 0.5 X10E3/UL (ref 0.1–0.9)
MONOCYTES NFR BLD AUTO: 11 %
NEUTROPHILS # BLD AUTO: 1.5 X10E3/UL (ref 1.4–7)
NEUTROPHILS NFR BLD AUTO: 36 %
PLATELET # BLD AUTO: 274 X10E3/UL (ref 150–450)
POTASSIUM SERPL-SCNC: 4.6 MMOL/L (ref 3.5–5.2)
RBC # BLD AUTO: 4.73 X10E6/UL (ref 3.77–5.28)
SODIUM SERPL-SCNC: 139 MMOL/L (ref 134–144)
TIBC SERPL-MCNC: 335 UG/DL (ref 250–450)
UIBC SERPL-MCNC: 162 UG/DL (ref 131–425)
VIT B12 SERPL-MCNC: 609 PG/ML (ref 232–1245)
WBC # BLD AUTO: 4.2 X10E3/UL (ref 3.4–10.8)

## 2022-02-21 RX ORDER — ACETAMINOPHEN 500 MG
2000 TABLET ORAL 2 TIMES DAILY
Qty: 180 CAPSULE | Refills: 3 | Status: SHIPPED | OUTPATIENT
Start: 2022-02-21 | End: 2022-06-23

## 2022-02-21 NOTE — TELEPHONE ENCOUNTER
Pt is requesting refill for:\  Requested Prescriptions     Pending Prescriptions Disp Refills    eltrombopag (Promacta) 50 mg tablet       Sig: Take 1 Tablet by mouth Daily (before breakfast).

## 2022-02-21 NOTE — PROGRESS NOTES
Please call the patient regarding her Vit D is low. I prescribed Vit D replacement. Other labs are good including the platelets. Pam Montejo, [facility] [phone]

## 2022-03-18 PROBLEM — D50.9 IRON DEFICIENCY ANEMIA: Status: ACTIVE | Noted: 2020-10-13

## 2022-03-19 PROBLEM — D69.3 ACUTE ITP (HCC): Status: ACTIVE | Noted: 2021-01-27

## 2022-03-19 PROBLEM — D69.6 THROMBOCYTOPENIA (HCC): Status: ACTIVE | Noted: 2020-10-13

## 2022-03-19 PROBLEM — D25.9 UTERINE FIBROID: Status: ACTIVE | Noted: 2021-12-09

## 2022-03-19 PROBLEM — N92.4 EXCESSIVE BLEEDING IN PREMENOPAUSAL PERIOD: Status: ACTIVE | Noted: 2020-10-13

## 2022-03-19 PROBLEM — R10.9 FLANK PAIN: Status: ACTIVE | Noted: 2019-04-11

## 2022-03-20 PROBLEM — N94.89 ADNEXAL MASS: Status: ACTIVE | Noted: 2020-10-13

## 2022-06-08 ENCOUNTER — TELEPHONE (OUTPATIENT)
Age: 37
End: 2022-06-08

## 2022-06-13 ENCOUNTER — TELEPHONE (OUTPATIENT)
Age: 37
End: 2022-06-13

## 2022-06-13 NOTE — TELEPHONE ENCOUNTER
Left message on voicemail to have patient contact our office to get labs done or reschedule until her labs are done

## 2022-06-14 DIAGNOSIS — D69.3 CHRONIC ITP (IDIOPATHIC THROMBOCYTOPENIA) (HCC): ICD-10-CM

## 2022-06-14 DIAGNOSIS — N92.4 EXCESSIVE BLEEDING IN PREMENOPAUSAL PERIOD: ICD-10-CM

## 2022-06-14 DIAGNOSIS — D50.9 IRON DEFICIENCY ANEMIA, UNSPECIFIED IRON DEFICIENCY ANEMIA TYPE: ICD-10-CM

## 2022-06-18 LAB
25(OH)D3+25(OH)D2 SERPL-MCNC: 17.3 NG/ML (ref 30–100)
ALBUMIN SERPL-MCNC: 4.3 G/DL (ref 3.8–4.8)
ALBUMIN/GLOB SERPL: 1.5 {RATIO} (ref 1.2–2.2)
ALP SERPL-CCNC: 54 IU/L (ref 44–121)
ALT SERPL-CCNC: 13 IU/L (ref 0–32)
AST SERPL-CCNC: 11 IU/L (ref 0–40)
BASOPHILS # BLD AUTO: 0 X10E3/UL (ref 0–0.2)
BASOPHILS NFR BLD AUTO: 1 %
BILIRUB SERPL-MCNC: 0.4 MG/DL (ref 0–1.2)
BUN SERPL-MCNC: 14 MG/DL (ref 6–20)
BUN/CREAT SERPL: 15 (ref 9–23)
CALCIUM SERPL-MCNC: 8.8 MG/DL (ref 8.7–10.2)
CHLORIDE SERPL-SCNC: 105 MMOL/L (ref 96–106)
CO2 SERPL-SCNC: 22 MMOL/L (ref 20–29)
CREAT SERPL-MCNC: 0.94 MG/DL (ref 0.57–1)
EGFR: 82 ML/MIN/1.73
EOSINOPHIL # BLD AUTO: 0.1 X10E3/UL (ref 0–0.4)
EOSINOPHIL NFR BLD AUTO: 2 %
ERYTHROCYTE [DISTWIDTH] IN BLOOD BY AUTOMATED COUNT: 13.2 % (ref 11.7–15.4)
FERRITIN SERPL-MCNC: 214 NG/ML (ref 15–150)
FOLATE SERPL-MCNC: 10 NG/ML
GLOBULIN SER CALC-MCNC: 2.8 G/DL (ref 1.5–4.5)
GLUCOSE SERPL-MCNC: 83 MG/DL (ref 65–99)
HCT VFR BLD AUTO: 38.6 % (ref 34–46.6)
HGB BLD-MCNC: 12.4 G/DL (ref 11.1–15.9)
IMM GRANULOCYTES # BLD AUTO: 0 X10E3/UL (ref 0–0.1)
IMM GRANULOCYTES NFR BLD AUTO: 0 %
IRON SATN MFR SERPL: 37 % (ref 15–55)
IRON SERPL-MCNC: 92 UG/DL (ref 27–159)
LYMPHOCYTES # BLD AUTO: 1.6 X10E3/UL (ref 0.7–3.1)
LYMPHOCYTES NFR BLD AUTO: 42 %
MCH RBC QN AUTO: 26.8 PG (ref 26.6–33)
MCHC RBC AUTO-ENTMCNC: 32.1 G/DL (ref 31.5–35.7)
MCV RBC AUTO: 84 FL (ref 79–97)
MONOCYTES # BLD AUTO: 0.5 X10E3/UL (ref 0.1–0.9)
MONOCYTES NFR BLD AUTO: 13 %
NEUTROPHILS # BLD AUTO: 1.6 X10E3/UL (ref 1.4–7)
NEUTROPHILS NFR BLD AUTO: 42 %
PLATELET # BLD AUTO: 302 X10E3/UL (ref 150–450)
POTASSIUM SERPL-SCNC: 4.4 MMOL/L (ref 3.5–5.2)
PROT SERPL-MCNC: 7.1 G/DL (ref 6–8.5)
RBC # BLD AUTO: 4.62 X10E6/UL (ref 3.77–5.28)
SODIUM SERPL-SCNC: 139 MMOL/L (ref 134–144)
TIBC SERPL-MCNC: 246 UG/DL (ref 250–450)
UIBC SERPL-MCNC: 154 UG/DL (ref 131–425)
VIT B12 SERPL-MCNC: 447 PG/ML (ref 232–1245)
WBC # BLD AUTO: 3.8 X10E3/UL (ref 3.4–10.8)

## 2022-06-23 ENCOUNTER — OFFICE VISIT (OUTPATIENT)
Age: 37
End: 2022-06-23
Payer: COMMERCIAL

## 2022-06-23 VITALS
BODY MASS INDEX: 26.31 KG/M2 | WEIGHT: 143 LBS | DIASTOLIC BLOOD PRESSURE: 87 MMHG | HEART RATE: 77 BPM | HEIGHT: 62 IN | RESPIRATION RATE: 16 BRPM | SYSTOLIC BLOOD PRESSURE: 131 MMHG | TEMPERATURE: 97.8 F | OXYGEN SATURATION: 98 %

## 2022-06-23 DIAGNOSIS — D50.9 IRON DEFICIENCY ANEMIA, UNSPECIFIED IRON DEFICIENCY ANEMIA TYPE: ICD-10-CM

## 2022-06-23 DIAGNOSIS — D69.3 CHRONIC ITP (IDIOPATHIC THROMBOCYTOPENIA) (HCC): Primary | ICD-10-CM

## 2022-06-23 DIAGNOSIS — E55.9 VITAMIN D DEFICIENCY: ICD-10-CM

## 2022-06-23 DIAGNOSIS — E53.8 VITAMIN B 12 DEFICIENCY: ICD-10-CM

## 2022-06-23 DIAGNOSIS — E03.8 OTHER SPECIFIED HYPOTHYROIDISM: ICD-10-CM

## 2022-06-23 DIAGNOSIS — N92.4 EXCESSIVE BLEEDING IN PREMENOPAUSAL PERIOD: ICD-10-CM

## 2022-06-23 PROCEDURE — 99214 OFFICE O/P EST MOD 30 MIN: CPT | Performed by: INTERNAL MEDICINE

## 2022-06-23 RX ORDER — ACETAMINOPHEN 500 MG
2000 TABLET ORAL 2 TIMES DAILY
Qty: 180 CAPSULE | Refills: 3 | Status: SHIPPED | OUTPATIENT
Start: 2022-06-23 | End: 2023-06-18

## 2022-06-23 NOTE — PROGRESS NOTES
Hematology/Oncology  Progress Note    Name: Tip Sample  Date: 2022  : 1985  Primary Care Provider: Arie Solares NP    Ms. Daniel Calderon is a 30y.o. year old female with ITP and IDAS    Plateletts have gradually increased to 302K form 6K a year ago     Current Therapy: eltrombopag aka Promacta  50 mg daily        Subjective:       Mrs. Daniel Calderon has history of ITP.       She was also in the hospital on 2021 with heavy bruising and platelet with 2324.  She received platelet transfusion. The past medical, surgical and social history has been reviewed and remains unchanged. Past Medical History:   Diagnosis Date    Anemia, iron deficiency     Hydronephrosis     Idiopathic thrombocytopenic purpura (ITP) (HCC)     Leiomyoma of uterus, unspecified     Leukopenia     Ovarian cyst     Thrombocytopenia (HCC)      Past Surgical History:   Procedure Laterality Date    HX COLONOSCOPY      HX NEPHRECTOMY  2019    Laparoscopic right nephrectomy    HX OTHER SURGICAL       CYSTOSCOPY, WITH RIGHT RETROGRADE PYELOGRAM AND RIGHT URETERAL STENT REPLACEMENT    IR OCCL TXCATH ORGAN W SI  2021     Social History     Socioeconomic History    Marital status: SINGLE     Spouse name: Not on file    Number of children: Not on file    Years of education: Not on file    Highest education level: Not on file   Occupational History    Not on file   Tobacco Use    Smoking status: Never Smoker    Smokeless tobacco: Never Used   Substance and Sexual Activity    Alcohol use:  Yes    Drug use: Never    Sexual activity: Yes   Other Topics Concern     Service Not Asked    Blood Transfusions Not Asked    Caffeine Concern Not Asked    Occupational Exposure Not Asked    Hobby Hazards Not Asked    Sleep Concern Not Asked    Stress Concern Not Asked    Weight Concern Not Asked    Special Diet Not Asked    Back Care Not Asked    Exercise Not Asked    Bike Helmet Not Asked    Seat Belt Not Asked    Self-Exams Not Asked   Social History Narrative    Not on file     Social Determinants of Health     Financial Resource Strain:     Difficulty of Paying Living Expenses: Not on file   Food Insecurity:     Worried About Running Out of Food in the Last Year: Not on file    Emily of Food in the Last Year: Not on file   Transportation Needs:     Lack of Transportation (Medical): Not on file    Lack of Transportation (Non-Medical): Not on file   Physical Activity:     Days of Exercise per Week: Not on file    Minutes of Exercise per Session: Not on file   Stress:     Feeling of Stress : Not on file   Social Connections:     Frequency of Communication with Friends and Family: Not on file    Frequency of Social Gatherings with Friends and Family: Not on file    Attends Yarsani Services: Not on file    Active Member of [address] [place] [address] or Organizations: Not on file    Attends Club or Organization Meetings: Not on file    Marital Status: Not on file   Intimate Partner Violence:     Fear of Current or Ex-Partner: Not on file    Emotionally Abused: Not on file    Physically Abused: Not on file    Sexually Abused: Not on file   Housing Stability:     Unable to Pay for Housing in the Last Year: Not on file    Number of [place] in the Last Year: Not on file    Unstable Housing in the Last Year: Not on file     Family History   Problem Relation Age of Onset    Hypertension Mother      Current Outpatient Medications   Medication Sig Dispense Refill    cholecalciferol (VITAMIN D3) (2,000 UNITS /50 MCG) cap capsule Take 1 Capsule by mouth two (2) times a day for 360 days. Indications: low vitamin D levels 180 Capsule 3    eltrombopag (Promacta) 50 mg tablet Take 1 Tablet by mouth Daily (before breakfast) for 360 days.  Indications: severe low platelet count in unresponsive chronic ITP 90 Tablet 3    scopolamine (TRANSDERM-SCOP) 1 mg over 3 days pt3d 1 Patch by TransDERmal route every seventy-two (72) hours.  gabapentin (NEURONTIN) 300 mg capsule Take 1 Capsule by mouth two (2) times a day. Max Daily Amount: 600 mg. 14 Capsule 0    metoprolol tartrate (LOPRESSOR) 25 mg tablet Take 25 mg by mouth two (2) times a day.  levonorgestreL (Mirena) 20 mcg/24 hours (7 yrs) 52 mg IUD 1 Device by IntraUTERine route once.  acetaminophen (TYLENOL) 325 mg tablet Take  by mouth every four (4) hours as needed for Pain. Review of Systems:    General :The patient has no complaints and there is no physical distress evident. Psychological : patient denies having any psychological symptoms such as hallucinations depression or anxiety. Ophthalmic:the patient denies having any visual impairment or eye discomfort. ENT: there are no abnormalities reported. Allergy and Immunology:the patient denies having any seasonal allergies or allergies to medications other than those already outlined above. Hematological and Lymphatic: the patient denies having any bruising, bleeding or lymphadenopathy. Endocrine: the patient denies having any heat or cold intolerance. There is no history of diabetes or thyroid disorders. Breast: the patient denies having any history of breast mass or lumps. Respiratory:the patient denies having any cough, shortness of breath, or dyspnea on exertion. Cardiovascular: there are no complaints of chest pain, palpitations, chest pounding, or dyspnea on exertion. Gastrointestinal: the patient denies having nausea, emesis, diarrhea, constipation, or blood in the stool. Genito-Urinary: the patient denies having urinary urgency, frequency, or dysuria. Musculoskeletal: with the exception of mild arthralgias the patient has no other musculoskeletal complaints. Neurological:  denies having any numbness, tingling, or neurologic deficits.      Dermatological: patient denies having any unexplained rash, skin ulcerations, or hives.    Objective:     Visit Vitals  /87   Pulse 77   Temp 97.8 °F (36.6 °C)   Resp 16   Ht 5' 2\" (1.575 m)   Wt 64.9 kg (143 lb)   SpO2 98%   BMI 26.16 kg/m²     Pain Score: 0    Physical Exam:     ECO    General: Well appearing, in NAD    Psychologic: mood and affect are appropriate, no anxiety or depression noted    Skin: examination of the skin reveals no bruising, rash or petechiae    HEENT: Normocephalic, atraumatic. Conjunctiva and sclera are clear. Pupils are equal, round and reactive to light. EOMs are intact. ENT without oral mucosal lesions, stomatitis or thrush    Neck: supple without lymphadenopathy, JVD or thyromegaly    Lymphatics: no palpable cervical, supraclavicular, infraclavicular, axillary or inguinal lymphadenopathy    Lungs: clear breath sounds bilaterally, no rhonchi or wheezes noted    Heart: Regular rate and rhythm, no murmurs, rubs or gallops, S1-S2 noted. Abdomen: soft, non-tender, non-distended, no HSM    Extremities: without clubbing, cyanosis or edema    Neurologic: no focal deficits, steady gait, Alert and oriented x 3. Laboratory Data:     Results for orders placed or performed during the hospital encounter of 21   CBC WITH 3 PART DIFF     Status: None   Result Value Ref Range Status    WBC 7.0 4.5 - 13.0 K/uL Final    RBC 4.54 4.10 - 5.10 M/uL Final    HGB 12.6 12.0 - 16.0 g/dL Final    HCT 39.4 36 - 48 % Final    MCV 86.8 78 - 102 FL Final    MCH 27.8 25.0 - 35.0 PG Final    MCHC 32.0 31 - 37 g/dL Final    RDW 14.4 11.5 - 14.5 % Final    PLATELET 562 496 - 793 K/uL Final    NEUTROPHILS 50 40 - 70 % Final    Mixed cells 11 0.1 - 17 % Final    LYMPHOCYTES 39 14 - 44 % Final    ABS. NEUTROPHILS 3.5 1.8 - 9.5 K/UL Final    ABS. MIXED CELLS 0.8 0.0 - 2.3 K/uL Final    ABS. LYMPHOCYTES 2.7 1.1 - 5.9 K/UL Final     Comment: Test performed at [address] [place] [address] or [facility]patient [facility]fusion [facility] Location.  Reviewed by [facility] Director. DF AUTOMATED   Final       Patient Active Problem List   Diagnosis Code    Flank pain R10.9    Thrombocytopenia (HCC) D69.6    Iron deficiency anemia D50.9    Excessive bleeding in premenopausal period N92.4    Adnexal mass N94.89    Acute ITP (HCC) D69.3    Uterine fibroid D25.9         Assessment:     1. Chronic ITP (idiopathic thrombocytopenia) (HCC)    2. Excessive bleeding in premenopausal period    3. Iron deficiency anemia, unspecified iron deficiency anemia type    4. Vitamin D deficiency    5. Vitamin B 12 deficiency    6. Other specified hypothyroidism        Plan:     1. Continue Promacta 50 mg daily. 2. If patient's platelet count goes above 400 lower Promacta to 25 mg daily. 3. We shall reevaluate the patient in 2 months instead of 4 over the blood tests. 4. Patient will let us know if she is bleeding or bruising more easily than usual.  5. Patient is in agreement with this plan    Follow-up and Dispositions  ·   Return in about 2 months (around 8/23/2022) for Follow up with labs, Follow_up In Person. Orders Placed This Encounter    CBC WITH AUTOMATED DIFF     Standing Status:   Future     Standing Expiration Date:   1/64/3124    METABOLIC PANEL, COMPREHENSIVE     Standing Status:   Future     Standing Expiration Date:   6/24/2023    VITAMIN B12 & FOLATE     Standing Status:   Future     Standing Expiration Date:   6/24/2023    FERRITIN     Standing Status:   Future     Standing Expiration Date:   6/24/2023    IRON PROFILE     Standing Status:   Future     Standing Expiration Date:   6/24/2023    cholecalciferol (VITAMIN D3) (2,000 UNITS /50 MCG) cap capsule     Sig: Take 1 Capsule by mouth two (2) times a day for 360 days.  Indications: low vitamin D levels     Dispense:  180 Capsule     Refill:  3       Ashley Ling MD  6/23/2022

## 2022-08-23 DIAGNOSIS — D50.9 IRON DEFICIENCY ANEMIA, UNSPECIFIED IRON DEFICIENCY ANEMIA TYPE: ICD-10-CM

## 2022-08-23 DIAGNOSIS — E03.8 OTHER SPECIFIED HYPOTHYROIDISM: ICD-10-CM

## 2022-08-23 DIAGNOSIS — E55.9 VITAMIN D DEFICIENCY: ICD-10-CM

## 2022-08-23 DIAGNOSIS — E53.8 VITAMIN B 12 DEFICIENCY: ICD-10-CM

## 2022-08-23 DIAGNOSIS — D69.3 CHRONIC ITP (IDIOPATHIC THROMBOCYTOPENIA) (HCC): ICD-10-CM

## 2022-08-23 DIAGNOSIS — N92.4 EXCESSIVE BLEEDING IN PREMENOPAUSAL PERIOD: ICD-10-CM

## 2022-09-07 LAB
ALBUMIN SERPL-MCNC: 4.6 G/DL (ref 3.8–4.8)
ALBUMIN/GLOB SERPL: 1.6 {RATIO} (ref 1.2–2.2)
ALP SERPL-CCNC: 59 IU/L (ref 44–121)
ALT SERPL-CCNC: 5 IU/L (ref 0–32)
AST SERPL-CCNC: 12 IU/L (ref 0–40)
BASOPHILS # BLD AUTO: 0 X10E3/UL (ref 0–0.2)
BASOPHILS NFR BLD AUTO: 1 %
BILIRUB SERPL-MCNC: 0.6 MG/DL (ref 0–1.2)
BUN SERPL-MCNC: 9 MG/DL (ref 6–20)
BUN/CREAT SERPL: 9 (ref 9–23)
CALCIUM SERPL-MCNC: 9.2 MG/DL (ref 8.7–10.2)
CHLORIDE SERPL-SCNC: 99 MMOL/L (ref 96–106)
CO2 SERPL-SCNC: 22 MMOL/L (ref 20–29)
CREAT SERPL-MCNC: 1.01 MG/DL (ref 0.57–1)
EGFR: 74 ML/MIN/1.73
EOSINOPHIL # BLD AUTO: 0.1 X10E3/UL (ref 0–0.4)
EOSINOPHIL NFR BLD AUTO: 1 %
ERYTHROCYTE [DISTWIDTH] IN BLOOD BY AUTOMATED COUNT: 12.9 % (ref 11.7–15.4)
FERRITIN SERPL-MCNC: 236 NG/ML (ref 15–150)
FOLATE SERPL-MCNC: 9.5 NG/ML
GLOBULIN SER CALC-MCNC: 2.8 G/DL (ref 1.5–4.5)
GLUCOSE SERPL-MCNC: 79 MG/DL (ref 65–99)
HCT VFR BLD AUTO: 39.7 % (ref 34–46.6)
HGB BLD-MCNC: 12.3 G/DL (ref 11.1–15.9)
IMM GRANULOCYTES # BLD AUTO: 0 X10E3/UL (ref 0–0.1)
IMM GRANULOCYTES NFR BLD AUTO: 0 %
IRON SATN MFR SERPL: 44 % (ref 15–55)
IRON SERPL-MCNC: 169 UG/DL (ref 27–159)
LYMPHOCYTES # BLD AUTO: 2.1 X10E3/UL (ref 0.7–3.1)
LYMPHOCYTES NFR BLD AUTO: 40 %
MCH RBC QN AUTO: 26.6 PG (ref 26.6–33)
MCHC RBC AUTO-ENTMCNC: 31 G/DL (ref 31.5–35.7)
MCV RBC AUTO: 86 FL (ref 79–97)
MONOCYTES # BLD AUTO: 0.5 X10E3/UL (ref 0.1–0.9)
MONOCYTES NFR BLD AUTO: 10 %
NEUTROPHILS # BLD AUTO: 2.6 X10E3/UL (ref 1.4–7)
NEUTROPHILS NFR BLD AUTO: 48 %
PLATELET # BLD AUTO: 408 X10E3/UL (ref 150–450)
POTASSIUM SERPL-SCNC: 4.3 MMOL/L (ref 3.5–5.2)
PROT SERPL-MCNC: 7.4 G/DL (ref 6–8.5)
RBC # BLD AUTO: 4.63 X10E6/UL (ref 3.77–5.28)
SODIUM SERPL-SCNC: 137 MMOL/L (ref 134–144)
TIBC SERPL-MCNC: 381 UG/DL (ref 250–450)
UIBC SERPL-MCNC: 212 UG/DL (ref 131–425)
VIT B12 SERPL-MCNC: 560 PG/ML (ref 232–1245)
WBC # BLD AUTO: 5.4 X10E3/UL (ref 3.4–10.8)

## 2022-09-12 ENCOUNTER — OFFICE VISIT (OUTPATIENT)
Age: 37
End: 2022-09-12
Payer: COMMERCIAL

## 2022-09-12 VITALS
TEMPERATURE: 97.8 F | WEIGHT: 141 LBS | BODY MASS INDEX: 25.95 KG/M2 | RESPIRATION RATE: 14 BRPM | OXYGEN SATURATION: 99 % | HEIGHT: 62 IN | SYSTOLIC BLOOD PRESSURE: 134 MMHG | HEART RATE: 66 BPM | DIASTOLIC BLOOD PRESSURE: 88 MMHG

## 2022-09-12 DIAGNOSIS — D69.3 CHRONIC ITP (IDIOPATHIC THROMBOCYTOPENIA) (HCC): Primary | ICD-10-CM

## 2022-09-12 DIAGNOSIS — E55.9 VITAMIN D DEFICIENCY: ICD-10-CM

## 2022-09-12 DIAGNOSIS — D50.9 IRON DEFICIENCY ANEMIA, UNSPECIFIED IRON DEFICIENCY ANEMIA TYPE: ICD-10-CM

## 2022-09-12 DIAGNOSIS — E53.8 VITAMIN B 12 DEFICIENCY: ICD-10-CM

## 2022-09-12 DIAGNOSIS — E61.1 IRON DEFICIENCY: ICD-10-CM

## 2022-09-12 PROCEDURE — 99214 OFFICE O/P EST MOD 30 MIN: CPT | Performed by: INTERNAL MEDICINE

## 2022-09-12 NOTE — PROGRESS NOTES
Hematology/Oncology  Progress Note    Name: Farrukh Horne  Date: 2022  : 1985  Primary Care Provider: Carroll Calzada NP    Ms. Dyan Evans is a 30y.o. year old female with with ITP and IDAS     Plateletts have gradually increased to 408K form 6K a year ago    CBC otherwise normal    Iron stores are good     Current Therapy: eltrombopag aka Promacta  50 mg daily. Subjective:     Mrs. Dyan Evans has history of ITP. She was also in the hospital on 2021 with heavy bruising and platelet with 0019. She received platelet transfusion. The past medical, surgical and social history has been reviewed and remains unchanged.    Past Medical History:   Diagnosis Date    Anemia, iron deficiency     Hydronephrosis     Idiopathic thrombocytopenic purpura (ITP) (HCC)     Leiomyoma of uterus, unspecified     Leukopenia     Ovarian cyst     Thrombocytopenia (HCC)      Past Surgical History:   Procedure Laterality Date    HX COLONOSCOPY      HX NEPHRECTOMY  2019    Laparoscopic right nephrectomy    HX OTHER SURGICAL       CYSTOSCOPY, WITH RIGHT RETROGRADE PYELOGRAM AND RIGHT URETERAL STENT REPLACEMENT    IR OCCL TXCATH ORGAN W SI  2021     Social History     Socioeconomic History    Marital status: SINGLE     Spouse name: Not on file    Number of children: Not on file    Years of education: Not on file    Highest education level: Not on file   Occupational History    Not on file   Tobacco Use    Smoking status: Never    Smokeless tobacco: Never   Vaping Use    Vaping Use: Never used   Substance and Sexual Activity    Alcohol use: Not Currently    Drug use: Never    Sexual activity: Yes   Other Topics Concern     Service Not Asked    Blood Transfusions Not Asked    Caffeine Concern Not Asked    Occupational Exposure Not Asked    Hobby Hazards Not Asked    Sleep Concern Not Asked    Stress Concern Not Asked    Weight Concern Not Asked    Special Diet Not Asked    Back Care Not Asked    Exercise Not Asked    Bike Helmet Not Asked    Seat Belt Not Asked    Self-Exams Not Asked   Social History Narrative    Not on file     Social Determinants of Health     Financial Resource Strain: Not on file   Food Insecurity: Not on file   Transportation Needs: Not on file   Physical Activity: Not on file   Stress: Not on file   Social Connections: Not on file   Intimate Partner Violence: Not on file   Housing Stability: Not on file     Family History   Problem Relation Age of Onset    Hypertension Mother      Current Outpatient Medications   Medication Sig Dispense Refill    eltrombopag (Promacta) 50 mg tablet Take 0.5 Tablets by mouth Daily (before breakfast) for 360 days. Indications: severe low platelet count in unresponsive chronic ITP 45 Tablet 3    cholecalciferol (VITAMIN D3) (2,000 UNITS /50 MCG) cap capsule Take 1 Capsule by mouth two (2) times a day for 360 days. Indications: low vitamin D levels 180 Capsule 3    scopolamine (TRANSDERM-SCOP) 1 mg over 3 days pt3d 1 Patch by TransDERmal route every seventy-two (72) hours. gabapentin (NEURONTIN) 300 mg capsule Take 1 Capsule by mouth two (2) times a day. Max Daily Amount: 600 mg. 14 Capsule 0    metoprolol tartrate (LOPRESSOR) 25 mg tablet Take 25 mg by mouth two (2) times a day. levonorgestreL (Mirena) 20 mcg/24 hours (7 yrs) 52 mg IUD 1 Device by IntraUTERine route once. acetaminophen (TYLENOL) 325 mg tablet Take  by mouth every four (4) hours as needed for Pain. Review of Systems:    General :The patient has no complaints except for mild fatigue    Psychological : patient denies having any psychological symptoms such as hallucinations depression or anxiety. Ophthalmic:the patient denies having any visual impairment or eye discomfort. ENT: there are no abnormalities reported.      Allergy and Immunology:the patient denies having any seasonal allergies or allergies to medications other than those already outlined above.     Hematological and Lymphatic: the patient denies having any bruising, bleeding or lymphadenopathy. Endocrine: the patient denies having any heat or cold intolerance. There is no history of diabetes or thyroid disorders. Breast: the patient denies having any history of breast mass or lumps. Respiratory:the patient denies having any cough, shortness of breath, or dyspnea on exertion. Cardiovascular: there are no complaints of chest pain, palpitations, chest pounding, or dyspnea on exertion. Gastrointestinal: the patient denies having nausea, emesis, diarrhea, constipation, or blood in the stool. Genito-Urinary: the patient denies having urinary urgency, frequency, or dysuria. Musculoskeletal: with the exception of mild arthralgias the patient has no other musculoskeletal complaints. Neurological:  denies having any numbness, tingling, or neurologic deficits. Dermatological: patient denies having any unexplained rash, skin ulcerations, or hives. Objective:     Visit Vitals  /88 (BP 1 Location: Left upper arm, BP Patient Position: Sitting)   Pulse 66   Temp 97.8 °F (36.6 °C) (Temporal)   Resp 14   Ht 5' 2\" (1.575 m)   Wt 64 kg (141 lb)   SpO2 99%   BMI 25.79 kg/m²     Pain Score: 0    Physical Exam:     ECO    General: Well appearing, in NAD    Psychologic: mood and affect are appropriate, no anxiety or depression noted    Skin: examination of the skin reveals no bruising, rash or petechiae    HEENT: Normocephalic, atraumatic. Conjunctiva and sclera are clear. Pupils are equal, round and reactive to light. EOMs are intact.      ENT without oral mucosal lesions, stomatitis or thrush    Neck: supple without lymphadenopathy, JVD or thyromegaly    Lymphatics: no palpable cervical, supraclavicular, infraclavicular, axillary lymphadenopathy      Lungs: clear breath sounds bilaterally, no rhonchi or wheezes noted    Heart: Regular rate and rhythm, no murmurs, rubs or gallops, S1-S2 noted. Abdomen: soft, non-tender, non-distended, no HSM    Extremities: without clubbing, cyanosis or edema    Neurologic: no focal deficits, steady gait, Alert and oriented x 3. Laboratory Data:     Results for orders placed or performed during the hospital encounter of 02/19/21   CBC WITH 3 PART DIFF     Status: None   Result Value Ref Range Status    WBC 7.0 4.5 - 13.0 K/uL Final    RBC 4.54 4.10 - 5.10 M/uL Final    HGB 12.6 12.0 - 16.0 g/dL Final    HCT 39.4 36 - 48 % Final    MCV 86.8 78 - 102 FL Final    MCH 27.8 25.0 - 35.0 PG Final    MCHC 32.0 31 - 37 g/dL Final    RDW 14.4 11.5 - 14.5 % Final    PLATELET 264 012 - 934 K/uL Final    NEUTROPHILS 50 40 - 70 % Final    Mixed cells 11 0.1 - 17 % Final    LYMPHOCYTES 39 14 - 44 % Final    ABS. NEUTROPHILS 3.5 1.8 - 9.5 K/UL Final    ABS. MIXED CELLS 0.8 0.0 - 2.3 K/uL Final    ABS. LYMPHOCYTES 2.7 1.1 - 5.9 K/UL Final     Comment: Test performed at [address] [place] [address] or [facility]patient [facility]fusion [facility] Location. Reviewed by Medical Director. DF AUTOMATED   Final       Patient Active Problem List   Diagnosis Code    Flank pain R10.9    Thrombocytopenia (HCC) D69.6    Iron deficiency anemia D50.9    Excessive bleeding in premenopausal period N92.4    Adnexal mass N94.89    Acute ITP (HCC) D69.3    Uterine fibroid D25.9         Assessment:     1. Chronic ITP (idiopathic thrombocytopenia) (HCC)    2. Iron deficiency anemia, unspecified iron deficiency anemia type    3. Vitamin D deficiency    4. Vitamin B 12 deficiency        Plan:     Reduce  Promacta to 25 mg daily. as PLT count is over 400  We shall reevaluate the patient in 2 months with blood tests. Patient will let us know if she is bleeding or bruising more easily than usual.  Patient is in agreement with this plan    Follow-up and Dispositions    Return in about 2 months (around 11/12/2022).         Orders Placed This Encounter    eltrombopag (Promacta) 50 mg tablet     Sig: Take 0.5 Tablets by mouth Daily (before breakfast) for 360 days.  Indications: severe low platelet count in unresponsive chronic ITP     Dispense:  45 Tablet     Refill:  3       Juliette Loo MD  9/12/2022

## 2022-09-30 DIAGNOSIS — M79.651 PAIN OF RIGHT THIGH: ICD-10-CM

## 2022-09-30 DIAGNOSIS — D69.3 CHRONIC ITP (IDIOPATHIC THROMBOCYTOPENIA) (HCC): Primary | ICD-10-CM

## 2022-09-30 RX ORDER — ACETAMINOPHEN 500 MG
500 TABLET ORAL
Qty: 120 TABLET | Refills: 1 | Status: SHIPPED | OUTPATIENT
Start: 2022-09-30 | End: 2022-11-29

## 2022-09-30 RX ORDER — ACETAMINOPHEN 500 MG
500 TABLET ORAL
Qty: 120 TABLET | Refills: 1 | Status: SHIPPED | OUTPATIENT
Start: 2022-09-30 | End: 2022-09-30

## 2022-11-11 ENCOUNTER — TELEPHONE (OUTPATIENT)
Age: 37
End: 2022-11-11

## 2022-11-11 NOTE — TELEPHONE ENCOUNTER
Attempted to reach pt,left vm of reminder of appt on 11/17 @330PM. Also need confirmation on labs that were sent to [facility]

## 2022-11-12 DIAGNOSIS — E61.1 IRON DEFICIENCY: ICD-10-CM

## 2022-11-12 DIAGNOSIS — D50.9 IRON DEFICIENCY ANEMIA, UNSPECIFIED IRON DEFICIENCY ANEMIA TYPE: ICD-10-CM

## 2022-11-12 DIAGNOSIS — E53.8 VITAMIN B 12 DEFICIENCY: ICD-10-CM

## 2022-11-12 DIAGNOSIS — D69.3 CHRONIC ITP (IDIOPATHIC THROMBOCYTOPENIA) (HCC): ICD-10-CM

## 2022-11-12 DIAGNOSIS — E55.9 VITAMIN D DEFICIENCY: ICD-10-CM

## 2023-01-04 NOTE — TELEPHONE ENCOUNTER
Patient is making an appt with our office and is requesting a refill. According to Dr. Mcclure Null note patient is now on 25mg daily.

## 2023-01-05 ENCOUNTER — HOSPITAL ENCOUNTER (OUTPATIENT)
Dept: LAB | Age: 38
Discharge: HOME | End: 2023-01-05

## 2023-01-05 DIAGNOSIS — E55.9 VITAMIN D DEFICIENCY: ICD-10-CM

## 2023-01-05 DIAGNOSIS — D50.9 IRON DEFICIENCY ANEMIA, UNSPECIFIED IRON DEFICIENCY ANEMIA TYPE: ICD-10-CM

## 2023-01-05 DIAGNOSIS — D69.3 CHRONIC ITP (IDIOPATHIC THROMBOCYTOPENIA) (HCC): ICD-10-CM

## 2023-01-05 DIAGNOSIS — D69.6 THROMBOCYTOPENIA (HCC): ICD-10-CM

## 2023-01-05 DIAGNOSIS — D69.3 CHRONIC ITP (IDIOPATHIC THROMBOCYTOPENIA) (HCC): Primary | ICD-10-CM

## 2023-01-05 LAB — XX-LABCORP SPECIMEN COL,LCBCF: NORMAL

## 2023-01-05 PROCEDURE — 99001 SPECIMEN HANDLING PT-LAB: CPT

## 2023-01-06 LAB
25(OH)D3+25(OH)D2 SERPL-MCNC: 37.4 NG/ML (ref 30–100)
ALBUMIN SERPL-MCNC: 4 G/DL (ref 3.8–4.8)
ALBUMIN/GLOB SERPL: 1.4 {RATIO} (ref 1.2–2.2)
ALP SERPL-CCNC: 51 IU/L (ref 44–121)
ALT SERPL-CCNC: 11 IU/L (ref 0–32)
AST SERPL-CCNC: 14 IU/L (ref 0–40)
BASOPHILS # BLD AUTO: 0 X10E3/UL (ref 0–0.2)
BASOPHILS NFR BLD AUTO: 0 %
BILIRUB SERPL-MCNC: 0.5 MG/DL (ref 0–1.2)
BUN SERPL-MCNC: 12 MG/DL (ref 6–20)
BUN/CREAT SERPL: 11 (ref 9–23)
CALCIUM SERPL-MCNC: 8.6 MG/DL (ref 8.7–10.2)
CHLORIDE SERPL-SCNC: 102 MMOL/L (ref 96–106)
CO2 SERPL-SCNC: 22 MMOL/L (ref 20–29)
CREAT SERPL-MCNC: 1.06 MG/DL (ref 0.57–1)
EGFR: 70 ML/MIN/1.73
EOSINOPHIL # BLD AUTO: 0 X10E3/UL (ref 0–0.4)
EOSINOPHIL NFR BLD AUTO: 1 %
ERYTHROCYTE [DISTWIDTH] IN BLOOD BY AUTOMATED COUNT: 13 % (ref 11.7–15.4)
FERRITIN SERPL-MCNC: 181 NG/ML (ref 15–150)
GLOBULIN SER CALC-MCNC: 2.8 G/DL (ref 1.5–4.5)
GLUCOSE SERPL-MCNC: 82 MG/DL (ref 70–99)
HCT VFR BLD AUTO: 41.7 % (ref 34–46.6)
HGB BLD-MCNC: 13.5 G/DL (ref 11.1–15.9)
IMM GRANULOCYTES # BLD AUTO: 0 X10E3/UL (ref 0–0.1)
IMM GRANULOCYTES NFR BLD AUTO: 0 %
IRON SATN MFR SERPL: 47 % (ref 15–55)
IRON SERPL-MCNC: 112 UG/DL (ref 27–159)
LYMPHOCYTES # BLD AUTO: 2 X10E3/UL (ref 0.7–3.1)
LYMPHOCYTES NFR BLD AUTO: 53 %
MCH RBC QN AUTO: 27.6 PG (ref 26.6–33)
MCHC RBC AUTO-ENTMCNC: 32.4 G/DL (ref 31.5–35.7)
MCV RBC AUTO: 85 FL (ref 79–97)
MONOCYTES # BLD AUTO: 0.4 X10E3/UL (ref 0.1–0.9)
MONOCYTES NFR BLD AUTO: 12 %
MORPHOLOGY BLD-IMP: ABNORMAL
NEUTROPHILS # BLD AUTO: 1.3 X10E3/UL (ref 1.4–7)
NEUTROPHILS NFR BLD AUTO: 34 %
PLATELET # BLD AUTO: 55 X10E3/UL (ref 150–450)
POTASSIUM SERPL-SCNC: 3.9 MMOL/L (ref 3.5–5.2)
PROT SERPL-MCNC: 6.8 G/DL (ref 6–8.5)
RBC # BLD AUTO: 4.89 X10E6/UL (ref 3.77–5.28)
SODIUM SERPL-SCNC: 136 MMOL/L (ref 134–144)
TIBC SERPL-MCNC: 238 UG/DL (ref 250–450)
UIBC SERPL-MCNC: 126 UG/DL (ref 131–425)
WBC # BLD AUTO: 3.7 X10E3/UL (ref 3.4–10.8)

## 2023-01-10 ENCOUNTER — OFFICE VISIT (OUTPATIENT)
Dept: ONCOLOGY | Age: 38
End: 2023-01-10
Payer: COMMERCIAL

## 2023-01-10 VITALS
DIASTOLIC BLOOD PRESSURE: 89 MMHG | HEIGHT: 62 IN | OXYGEN SATURATION: 98 % | RESPIRATION RATE: 16 BRPM | SYSTOLIC BLOOD PRESSURE: 131 MMHG | HEART RATE: 67 BPM | WEIGHT: 132 LBS | BODY MASS INDEX: 24.29 KG/M2

## 2023-01-10 DIAGNOSIS — D69.3 CHRONIC ITP (IDIOPATHIC THROMBOCYTOPENIA) (HCC): Primary | ICD-10-CM

## 2023-01-10 NOTE — PROGRESS NOTES
HEMATOLOGY/MEDICAL ONCOLOGY PROGRESS NOTE    Name: Chi Celeste  : 1985  Date: 01/10/23    PCP: Tramaine Tarango NP    SUBJECTIVE  Ms. Chi Celeste is a 30 y.o. female who was seen for follow up for management of her ITP. Patient was previously seen by Dr Massiel Prabhakar who left the practice. She is currently on eltrombopag (Promacta) 25mg PO daily before breakfast. Patient reports she did not have any Promacta left and that she missed several doses. Patient is requesting for new Rx to be sent to her new pharmacy. Today on review of systems she has no new ecchymosis. No petechiae. Has occasional nose bleed. No melena or bright red blood per rectum. All other point of review of system have been reviewed and were negative. Past Medical History, Surgical History, Family, and Social History reviewed and remain unchanged.   Past Medical History:   Diagnosis Date    Anemia, iron deficiency     Hydronephrosis     Idiopathic thrombocytopenic purpura (ITP) (HCC)     Leiomyoma of uterus, unspecified     Leukopenia     Ovarian cyst     Thrombocytopenia (HCC)      Past Surgical History:   Procedure Laterality Date    HX COLONOSCOPY      HX NEPHRECTOMY  2019    Laparoscopic right nephrectomy    HX OTHER SURGICAL       CYSTOSCOPY, WITH RIGHT RETROGRADE PYELOGRAM AND RIGHT URETERAL STENT REPLACEMENT    IR OCCL TXCATH ORGAN W SI  2021     Social History     Socioeconomic History    Marital status: SINGLE   Tobacco Use    Smoking status: Never    Smokeless tobacco: Never   Vaping Use    Vaping Use: Never used   Substance and Sexual Activity    Alcohol use: Not Currently    Drug use: Never    Sexual activity: Yes     Family History   Problem Relation Age of Onset    Hypertension Mother        Current Outpatient Medications   Medication Sig Dispense Refill    eltrombopag (Promacta) 25 mg tablet Take 1 Tablet by mouth Daily (before breakfast). 30 Tablet 1    eltrombopag (Promacta) 50 mg tablet Take 0.5 Tablets by mouth Daily (before breakfast) for 360 days. Indications: severe low platelet count in unresponsive chronic ITP 45 Tablet 3    cholecalciferol (VITAMIN D3) (2,000 UNITS /50 MCG) cap capsule Take 1 Capsule by mouth two (2) times a day for 360 days. Indications: low vitamin D levels 180 Capsule 3    scopolamine (TRANSDERM-SCOP) 1 mg over 3 days pt3d 1 Patch by TransDERmal route every seventy-two (72) hours. gabapentin (NEURONTIN) 300 mg capsule Take 1 Capsule by mouth two (2) times a day. Max Daily Amount: 600 mg. 14 Capsule 0    metoprolol tartrate (LOPRESSOR) 25 mg tablet Take 25 mg by mouth two (2) times a day. levonorgestreL (Mirena) 20 mcg/24 hours (7 yrs) 52 mg IUD 1 Device by IntraUTERine route once. Allergies   Allergen Reactions    Nsaids (Non-Steroidal Anti-Inflammatory Drug) Other (comments)     UNABLE TO TAKE DUE TO KIDNEY DISEASE       Review of Systems   Constitutional:  Negative for chills, fever and malaise/fatigue. HENT:  Negative for congestion and sore throat. Respiratory:  Negative for cough and shortness of breath. Cardiovascular:  Negative for chest pain and leg swelling. Gastrointestinal:  Negative for abdominal pain, blood in stool, constipation, diarrhea, melena, nausea and vomiting. Genitourinary:  Negative for hematuria. Musculoskeletal:  Negative for myalgias. Skin:  Negative for rash. Neurological:  Negative for dizziness, focal weakness, weakness and headaches. Endo/Heme/Allergies:  Does not bruise/bleed easily.      Objective:  Visit Vitals  /89   Pulse 67   Resp 16   Ht 5' 2\" (1.575 m)   Wt 59.9 kg (132 lb)   SpO2 98%   BMI 24.14 kg/m²     Physical Exam:   General appearance - alert, well appearing, and in no distress  Mental status - alert, oriented to person, place, and time  EYE-NADIRA, EOMI  ENT-ENT exam normal, no neck nodes or sinus tenderness  Mouth - mucous membranes moist, pharynx normal without lesions  Neck - supple, no significant adenopathy   Chest - clear to auscultation, no wheezes, rales or rhonchi, symmetric air entry   Heart - normal rate and regular rhythm   Abdomen - soft, nontender, nondistended, no masses or organomegaly  Lymph- no adenopathy palpable  Ext-no pedal edema noted  Skin-Warm and dry. Neuro -alert, oriented, normal speech, no focal findings or movement disorder noted  Breast - no masses palapated b/l      Diagnostic Imaging     Results for orders placed during the hospital encounter of 12/09/21    IR OCCL TXCATH ORGAN W SI    Addendum 12/10/2021  9:32 AM  History: Symptomatic uterine fibroids. Impression: Myomatous uterus. Successful bilateral uterine artery embolization. Right nephrectomy. Intrauterine device. Comment: Using aseptic technique under fluoroscopic control while the patient  was under intravenous conscious sedation a right common femoral arterial  puncture was performed. A guidewire followed by 4 Ghanaian angiographic flush  catheter was advanced into the aorta and seated at the level of the aortic  bifurcation. AP pelvic arteriography was then obtained. The catheter was then  exchanged for a 5 [employer] RU selective visceral catheter. This was used to  select the left internal iliac and then the left uterine artery. Contrast  injection in each location reveals the presence of an enlarged myomatous uterus. Therefore a coaxial microcatheter system was placed in the distal transverse  segment of the left uterine artery. 500 to 700 micron  embospheres were used to  embolize the left uterine artery. Post embolization arteriography reveals an  excellent result. The RUC catheter was then repositioned into the right internal  iliac artery and the right uterine artery. Contrast injection from each location  also reveals the presence of an enlarged myomatous uterus.  Therefore the coaxial  microcatheter was replaced and its tip positioned in the distal transverse  portion of the right uterine artery. 500 to 700 micron embospheres were used to  embolize the right uterine artery. Intra-arterial lidocaine was injected into  each uterine artery after the embospheres. Post embolization arteriography  reveals an excellent result. The catheter was then exchanged for a 5 Sammarinese  angiographic flush catheter which was used to perform aorto pelvic arteriography  from the level of the renal arteries. This reveals the right kidney is been  surgically removed. The left renal artery, aorta and iliac arteries are patent. No ovarian collaterals are observed. Therefore, a successful bilateral uterine  artery embolization was achieved. The intrauterine device remains in place. The catheter was removed and hemostasis established with fingertip control. The  patient was admitted for observation and pain control. Prescriptions were written. The patient was instructed to return to see the  radiology nurse in 2 weeks for a followup office visit. She was also instructed  to see her referring physician Dr. Zoraida Cole for an office visit in 5 weeks as well. The radiology nurse provided moderate intravenous conscious sedation with  fentanyl and Versed. Start time was 820 and end time was 859.    10.4 minutes of fluoro time was utilized. The DAP equals 28.9208 franklin per  centimeter square    Narrative  History: Symptomatic uterine fibroids. Impression  Impression: Myomatous uterus. Successful bilateral uterine artery embolization. Comment: Using aseptic technique under fluoroscopic control while the patient  was under intravenous conscious sedation a right common femoral arterial  puncture was performed. A guidewire followed by 4 Sammarinese angiographic flush  catheter was advanced into the aorta and seated at the level of the aortic  bifurcation. AP pelvic arteriography was then obtained.  The catheter was then  exchanged for a 5 [employer] Nur[employer] [facility] selective visceral catheter. This was used to  select the left internal iliac and then the left uterine artery. Contrast  injection in each location reveals the presence of an enlarged myomatous uterus. Therefore a coaxial microcatheter system was placed in the distal transverse  segment of the left uterine artery. 500 to 700 micron  embospheres were used to  embolize the left uterine artery. Post embolization arteriography reveals an  excellent result. The RUC catheter was then repositioned into the right internal  iliac artery and the right uterine artery. Contrast injection from each location  also reveals the presence of an enlarged myomatous uterus. Therefore the coaxial  microcatheter was replaced and its tip positioned in the distal transverse  portion of the right uterine artery. 500 to 700 micron embospheres were used to  embolize the right uterine artery. Intra-arterial lidocaine was injected into  each uterine artery after the embospheres. Post embolization arteriography  reveals an excellent result. The catheter was then exchanged for a 5 Chinese  angiographic flush catheter which was used to perform aorto pelvic arteriography  from the level of the renal arteries. This reveals the renal arteries, aorta and  iliac arteries are patent. No ovarian collaterals are observed. Therefore, a  successful bilateral uterine artery embolization was achieved. The catheter was removed and hemostasis established with fingertip control. The  patient was admitted for observation and pain control. Prescriptions were written. The patient was instructed to return to see the  radiology nurse in 2 weeks for a followup office visit. She was also instructed  to see her referring physician Dr. Carole Quintero for an office visit in 5 weeks as well. The radiology nurse provided moderate intravenous conscious sedation with  fentanyl and Versed. Start time was 820 and end time was 859.    10.4 minutes of fluoro time was utilized. The DAP equals 28.9208 franklin per  centimeter square. Results for orders placed in visit on 19    XR CHEST PA LAT    [address] [facility]  [address] [facility] [id number]  [phone]    Imaging Result    Name:  Darinel Castelan ([id number])  Sex: Female :  1985 Ordering Provider: Zaire RODRIGUEZ Provider:   Diagnosis:  Pre-op chest exam [Z01.811 (ICD-10-CM)]  None Specified  Procedures Performed:  CHEST PA AND LATERAL  [id number] Exam Date/Time:  2019 10:06 AM      EXAM: CHEST PA AND LATERAL RADIOGRAPH    CLINICAL HISTORY/INDICATION: Preoperative evaluation    COMPARISON: 2016. TECHNIQUE: 2 view    FINDINGS:    There are no significant skeletal abnormalities. There are no pleural effusions. The heart and pulmonary vasculature is unremarkable. There is no hilar or mediastinal lymphadenopathy. There is no pulmonary edema, pneumothorax or focal opacity to suggest pneumonia. Right ureteral stent. IMPRESSION    1. Stable chest. No acute cardiopulmonary disease. Signed By: Iesha Sanchez MD on 2019 10:30 AM        Dictated by: Samreen Raza on  10:28:39 AM EDT  Signed By:Ratna Olivarez MD  2019 10:30 AM      Results for orders placed in visit on 19    CT ABD PELV W CONT    Impression  [address] [facility] Cat Scan  [address] [place] [id number]  [phone]    Imaging Result    Name:  Darinel Castelan ([id number])  Sex: Female :  1985 Ordering Provider: Chio RODRIGUEZ Provider:   Diagnosis:    Abd pain, RLQ, appendicitis suspected  Procedures Performed:  CT ABD/PELVIS-IV ONLY  [id number] Exam Date/Time:  2019  3:59 AM      EXAM: CT ABDOMEN AND PELVIS WITH CONTRAST    CLINICAL INDICATION/HISTORY: Abd pain, RLQ, appendicitis suspected . Lower abdominal pain and dysuria.     COMPARISON: CT abdomen/pelvis 5/7/2013    TECHNIQUE:  CT abdomen and pelvis with 100 cc of Omnipaque 350 IV contrast.  All CT scans at this facility are performed using dose optimization technique as appropriate to the performed examination, to include automated exposure control, adjustment of the mA and/or kV according to patient's size (including appropriate matching for site-specific examinations), or use of an iterative reconstruction technique. FINDINGS:  Lower chest: Minimal bibasilar atelectasis. Small hiatal hernia. Liver: Negative. Biliary: Negative. Pancreas: Negative. Spleen: Negative. Adrenal glands: Negative. Kidneys: Severe right hydronephrosis and hydroureter. No obstructing stone or mass is seen. The left kidney is unremarkable. Stomach, Small Bowel and Colon: Diverticulosis. No evidence for diverticulitis. No small bowel obstruction. Normal appendix. Bladder and Pelvic Organs:    Lymph nodes: No lymphadenopathy. Vessels: Unremarkable for age. Peritoneal Spaces: No free fluid or free air. Body wall: Negative. Bones: Unremarkable for age. IMPRESSION    1. Severe right hydronephrosis and hydroureter to the level of the bladder, without clear etiology. Cortical thinning of the right kidney suggests this may be chronic. Recommend urologic evaluation and nonemergent CT urogram.    A notification that a wet reading is available was posted to the ED trackboard at 04:13 AM on 4/11/2019. Dictated BySarah Tinoco on 4/11/2019 4:15 AM    As attending radiologist, I have assessed the study images, and dictated or reviewed/edited the final report as needed.     Signed By: Sandrita Barriga MD on 4/11/2019 4:24 AM        Dictated by: Shabana Rhodes on Thu Apr 11, 2019  4:13:56 AM EDT  Signed By:Tho Marie MD  4/11/2019  4:24 AM      Lab Results  Lab Results   Component Value Date/Time    WBC 3.7 01/05/2023 12:00 AM    HGB 13.5 01/05/2023 12:00 AM    HCT 41.7 01/05/2023 12:00 AM PLATELET 55 (LL) 46/95/5766 12:00 AM    MCV 85 01/05/2023 12:00 AM       Lab Results   Component Value Date/Time    Sodium 136 01/05/2023 12:00 AM    Potassium 3.9 01/05/2023 12:00 AM    Chloride 102 01/05/2023 12:00 AM    CO2 22 01/05/2023 12:00 AM    Anion gap 5 07/22/2021 10:20 AM    Glucose 82 01/05/2023 12:00 AM    BUN 12 01/05/2023 12:00 AM    Creatinine 1.06 (H) 01/05/2023 12:00 AM    BUN/Creatinine ratio 11 01/05/2023 12:00 AM    GFR est AA 84 02/19/2022 11:29 AM    GFR est non-AA 73 02/19/2022 11:29 AM    Calcium 8.6 (L) 01/05/2023 12:00 AM    Alk. phosphatase 51 01/05/2023 12:00 AM    Protein, total 6.8 01/05/2023 12:00 AM    Albumin 4.0 01/05/2023 12:00 AM    Globulin 4.8 (H) 10/13/2020 03:49 PM    A-G Ratio 1.4 01/05/2023 12:00 AM    ALT (SGPT) 11 01/05/2023 12:00 AM     Lab Results   Component Value Date/Time    Iron 112 01/05/2023 12:00 AM    TIBC 238 (L) 01/05/2023 12:00 AM    Iron % saturation 47 01/05/2023 12:00 AM    Ferritin 181 (H) 01/05/2023 12:00 AM     Lab Results   Component Value Date/Time    Vitamin D 25-Hydroxy 10.6 (L) 08/27/2019 09:03 AM    VITAMIN D, 25-HYDROXY 37.4 01/05/2023 12:00 AM         Follow-up with PCP for health maintenance  Assessment/Plan:  ITP  --Patient with chronic with multiple episodes of platelet as low as 7977. Completed platelet transfusion during hospitalization on 07/29/2021. --Patient has Chronic ITP with multiple episodes of severe thrombocytopenia requiring platelets transfusions as well as bleeding with anemia. --Ultrasound of the abdomen on 8/18/2021 showed right  nephrectomy otherwise liver and spleen were normal.  --Labs on 8/25/2021 showed a platelet of 6 \"unable to perform verification on the peripheral smear due to platelet clumping. H pylori negative. Negative HIV screening. EBV positive but only with IgG antibodies BUN 14, creatinine 1.07. HIV testing negative.   Ferritin 532, transferrin saturation 38%.  --01/05/2023: CBC showed WBC 3.7K/uL, hemoglobin 13.5g/dL with hematocrit of 41.7%. Platelet 55.  --56/38/5498: Promacta was reduced to 25mg PO daily by Dr. Adelia Singleton due to Platelet >449.   -- Patient was being followed by Dr. Adelia Singleton who left the practice. The patient presents to [facility] clinic today for follows up. -- Today she reports she did not have any Promacta left and that she missed several doses. Patient is requesting for new Rx to be sent to her new pharmacy. -- Patient is a traveling Dialysis tech  -- Advised patient to call if there are any bleeding or bruising. Patient agreed with plan and verbalized understanding  -- Recheck CBC in 4 weeks    Vitamin D Deficiency  --01/05/2022 Vitamin D normal  --Defer to PCP    Follow up in 4 weeks with repeat labs or sooner if indicated. Orders Placed This Encounter    CBC WITH AUTOMATED DIFF     Standing Status:   Future     Standing Expiration Date:   1/11/2024    eltrombopag (Promacta) 25 mg tablet     Sig: Take 1 Tablet by mouth Daily (before breakfast). Dispense:  90 Tablet     Refill:  3         Philippe Flannery MD  01/10/23      All of patient's questions answered to their apparent satisfaction. They verbally show understanding and agreement with aforementioned plan. Above mentioned total time spent for this encounter with more than 50% of the time spent in face-to-face counseling, reviewing previous charts, discussing on diagnosis and management plan going forward, and co-ordination of care. Parts of this document has been produced using Dragon dictation system. Unrecognized errors in transcription may be present. Please do not hesitate to reach out for any questions or clarifications.         PCP: Michael Bauer NP

## 2023-01-20 RX ORDER — TRANEXAMIC ACID 650 MG/1
650 TABLET ORAL 3 TIMES DAILY
Qty: 30 TABLET | Refills: 0 | Status: SHIPPED | OUTPATIENT
Start: 2023-01-20

## 2023-01-23 ENCOUNTER — TELEPHONE (OUTPATIENT)
Dept: ONCOLOGY | Age: 38
End: 2023-01-23

## 2023-01-23 NOTE — TELEPHONE ENCOUNTER
Patient \"advised she was spotting this weekend, when she woke up this morning she was bleeding heavy\".  She is requesting  medication Tranexamic Acid 650 mg tablet

## 2023-02-10 DIAGNOSIS — D69.3 CHRONIC ITP (IDIOPATHIC THROMBOCYTOPENIA) (HCC): ICD-10-CM

## 2023-03-03 ENCOUNTER — HOSPITAL ENCOUNTER (OUTPATIENT)
Facility: HOSPITAL | Age: 38
Discharge: HOME | End: 2023-03-03
Payer: COMMERCIAL

## 2023-03-03 DIAGNOSIS — D69.6 THROMBOCYTOPENIA, UNSPECIFIED (HCC): ICD-10-CM

## 2023-03-03 DIAGNOSIS — E53.8 DEFICIENCY OF OTHER SPECIFIED B GROUP VITAMINS: ICD-10-CM

## 2023-03-03 DIAGNOSIS — E55.9 VITAMIN D DEFICIENCY, UNSPECIFIED: ICD-10-CM

## 2023-03-03 DIAGNOSIS — D50.9 IRON DEFICIENCY ANEMIA, UNSPECIFIED IRON DEFICIENCY ANEMIA TYPE: ICD-10-CM

## 2023-03-03 DIAGNOSIS — D69.3 IMMUNE THROMBOCYTOPENIC PURPURA (HCC): Primary | ICD-10-CM

## 2023-03-03 DIAGNOSIS — D50.9 IRON DEFICIENCY ANEMIA, UNSPECIFIED IRON DEFICIENCY ANEMIA TYPE: Primary | ICD-10-CM

## 2023-03-03 DIAGNOSIS — D69.3 IMMUNE THROMBOCYTOPENIC PURPURA (HCC): ICD-10-CM

## 2023-03-03 DIAGNOSIS — E55.9 VITAMIN D DEFICIENCY, UNSPECIFIED: Primary | ICD-10-CM

## 2023-03-03 LAB
25(OH)D3 SERPL-MCNC: 30.4 NG/ML (ref 30–100)
ALBUMIN SERPL-MCNC: 3.8 G/DL (ref 3.4–5)
ALBUMIN/GLOB SERPL: 1 (ref 0.8–1.7)
ALP SERPL-CCNC: 54 U/L (ref 45–117)
ALT SERPL-CCNC: 23 U/L (ref 13–56)
ANION GAP SERPL CALC-SCNC: 3 MMOL/L (ref 3–18)
AST SERPL-CCNC: 20 U/L (ref 10–38)
BASOPHILS # BLD: 0 K/UL (ref 0–0.1)
BASOPHILS NFR BLD: 1 % (ref 0–2)
BILIRUB SERPL-MCNC: 0.5 MG/DL (ref 0.2–1)
BUN SERPL-MCNC: 12 MG/DL (ref 7–18)
BUN/CREAT SERPL: 13 (ref 12–20)
CALCIUM SERPL-MCNC: 9.1 MG/DL (ref 8.5–10.1)
CHLORIDE SERPL-SCNC: 107 MMOL/L (ref 100–111)
CO2 SERPL-SCNC: 28 MMOL/L (ref 21–32)
CREAT SERPL-MCNC: 0.92 MG/DL (ref 0.6–1.3)
DIFFERENTIAL METHOD BLD: ABNORMAL
EOSINOPHIL # BLD: 0.1 K/UL (ref 0–0.4)
EOSINOPHIL NFR BLD: 3 % (ref 0–5)
ERYTHROCYTE [DISTWIDTH] IN BLOOD BY AUTOMATED COUNT: 13.9 % (ref 11.6–14.5)
FERRITIN SERPL-MCNC: 119 NG/ML (ref 8–388)
GLOBULIN SER CALC-MCNC: 3.9 G/DL (ref 2–4)
GLUCOSE SERPL-MCNC: 85 MG/DL (ref 74–99)
HCT VFR BLD AUTO: 37.5 % (ref 35–45)
HGB BLD-MCNC: 12.2 G/DL (ref 12–16)
IMM GRANULOCYTES # BLD AUTO: 0 K/UL (ref 0–0.04)
IMM GRANULOCYTES NFR BLD AUTO: 0 % (ref 0–0.5)
IRON SATN MFR SERPL: 33 % (ref 20–50)
IRON SERPL-MCNC: 97 UG/DL (ref 50–175)
LYMPHOCYTES # BLD: 2 K/UL (ref 0.9–3.6)
LYMPHOCYTES NFR BLD: 48 % (ref 21–52)
MCH RBC QN AUTO: 27.2 PG (ref 24–34)
MCHC RBC AUTO-ENTMCNC: 32.5 G/DL (ref 31–37)
MCV RBC AUTO: 83.7 FL (ref 78–100)
MONOCYTES # BLD: 0.5 K/UL (ref 0.05–1.2)
MONOCYTES NFR BLD: 11 % (ref 3–10)
NEUTS SEG # BLD: 1.7 K/UL (ref 1.8–8)
NEUTS SEG NFR BLD: 39 % (ref 40–73)
NRBC # BLD: 0 K/UL (ref 0–0.01)
NRBC BLD-RTO: 0 PER 100 WBC
PLATELET # BLD AUTO: 187 K/UL (ref 135–420)
PMV BLD AUTO: 11.9 FL (ref 9.2–11.8)
POTASSIUM SERPL-SCNC: 4.2 MMOL/L (ref 3.5–5.5)
PROT SERPL-MCNC: 7.7 G/DL (ref 6.4–8.2)
RBC # BLD AUTO: 4.48 M/UL (ref 4.2–5.3)
SODIUM SERPL-SCNC: 138 MMOL/L (ref 136–145)
TIBC SERPL-MCNC: 296 UG/DL (ref 250–450)
VIT B12 SERPL-MCNC: 445 PG/ML (ref 211–911)
WBC # BLD AUTO: 4.3 K/UL (ref 4.6–13.2)

## 2023-03-03 PROCEDURE — 85025 COMPLETE CBC W/AUTO DIFF WBC: CPT

## 2023-03-03 PROCEDURE — 82728 ASSAY OF FERRITIN: CPT

## 2023-03-03 PROCEDURE — 82607 VITAMIN B-12: CPT

## 2023-03-03 PROCEDURE — 82306 VITAMIN D 25 HYDROXY: CPT

## 2023-03-03 PROCEDURE — 80053 COMPREHEN METABOLIC PANEL: CPT

## 2023-03-03 PROCEDURE — 36415 COLL VENOUS BLD VENIPUNCTURE: CPT

## 2023-03-03 PROCEDURE — 83540 ASSAY OF IRON: CPT

## 2024-03-28 NOTE — Clinical Note
Reached Ms. Marcio Jacoby at work/ she has broken her cell phone. Informed her of the need for blood work ASA. She should call the office today. (0) No drift; leg holds 30 degree position for full 5 secs